# Patient Record
Sex: MALE | Race: WHITE | NOT HISPANIC OR LATINO | Employment: OTHER | URBAN - METROPOLITAN AREA
[De-identification: names, ages, dates, MRNs, and addresses within clinical notes are randomized per-mention and may not be internally consistent; named-entity substitution may affect disease eponyms.]

---

## 2020-01-01 ENCOUNTER — PATIENT OUTREACH (OUTPATIENT)
Dept: HEALTH INFORMATION MANAGEMENT | Facility: OTHER | Age: 65
End: 2020-01-01

## 2020-01-01 ENCOUNTER — APPOINTMENT (OUTPATIENT)
Dept: CARDIOLOGY | Facility: MEDICAL CENTER | Age: 65
DRG: 247 | End: 2020-01-01
Attending: NURSE PRACTITIONER
Payer: COMMERCIAL

## 2020-01-01 ENCOUNTER — HOSPITAL ENCOUNTER (OUTPATIENT)
Dept: RADIOLOGY | Facility: MEDICAL CENTER | Age: 65
End: 2020-02-14
Payer: COMMERCIAL

## 2020-01-01 ENCOUNTER — ANTICOAGULATION MONITORING (OUTPATIENT)
Dept: VASCULAR LAB | Facility: MEDICAL CENTER | Age: 65
End: 2020-01-01

## 2020-01-01 ENCOUNTER — APPOINTMENT (OUTPATIENT)
Dept: CARDIOLOGY | Facility: MEDICAL CENTER | Age: 65
DRG: 247 | End: 2020-01-01
Attending: STUDENT IN AN ORGANIZED HEALTH CARE EDUCATION/TRAINING PROGRAM
Payer: COMMERCIAL

## 2020-01-01 ENCOUNTER — HOSPITAL ENCOUNTER (INPATIENT)
Facility: MEDICAL CENTER | Age: 65
LOS: 2 days | DRG: 247 | End: 2020-02-16
Attending: EMERGENCY MEDICINE | Admitting: INTERNAL MEDICINE
Payer: COMMERCIAL

## 2020-01-01 ENCOUNTER — APPOINTMENT (OUTPATIENT)
Dept: RADIOLOGY | Facility: MEDICAL CENTER | Age: 65
DRG: 247 | End: 2020-01-01
Attending: STUDENT IN AN ORGANIZED HEALTH CARE EDUCATION/TRAINING PROGRAM
Payer: COMMERCIAL

## 2020-01-01 VITALS
TEMPERATURE: 97 F | OXYGEN SATURATION: 97 % | SYSTOLIC BLOOD PRESSURE: 123 MMHG | RESPIRATION RATE: 16 BRPM | DIASTOLIC BLOOD PRESSURE: 69 MMHG | HEIGHT: 75 IN | WEIGHT: 253.75 LBS | BODY MASS INDEX: 31.55 KG/M2 | HEART RATE: 67 BPM

## 2020-01-01 DIAGNOSIS — J44.9 CHRONIC OBSTRUCTIVE PULMONARY DISEASE, UNSPECIFIED COPD TYPE (HCC): Primary | ICD-10-CM

## 2020-01-01 DIAGNOSIS — I20.0 UNSTABLE ANGINA (HCC): ICD-10-CM

## 2020-01-01 DIAGNOSIS — R79.89 ELEVATED TROPONIN: ICD-10-CM

## 2020-01-01 LAB
ALBUMIN SERPL BCP-MCNC: 3.7 G/DL (ref 3.2–4.9)
ALBUMIN/GLOB SERPL: 1.2 G/DL
ALP SERPL-CCNC: 47 U/L (ref 30–99)
ALT SERPL-CCNC: 17 U/L (ref 2–50)
ANION GAP SERPL CALC-SCNC: 8 MMOL/L (ref 0–11.9)
ANION GAP SERPL CALC-SCNC: 9 MMOL/L (ref 0–11.9)
APTT PPP: 21.2 SEC (ref 24.7–36)
AST SERPL-CCNC: 11 U/L (ref 12–45)
BASOPHILS # BLD AUTO: 0.6 % (ref 0–1.8)
BASOPHILS # BLD AUTO: 0.7 % (ref 0–1.8)
BASOPHILS # BLD: 0.1 K/UL (ref 0–0.12)
BASOPHILS # BLD: 0.12 K/UL (ref 0–0.12)
BILIRUB SERPL-MCNC: 0.4 MG/DL (ref 0.1–1.5)
BUN SERPL-MCNC: 17 MG/DL (ref 8–22)
BUN SERPL-MCNC: 24 MG/DL (ref 8–22)
CALCIUM SERPL-MCNC: 8.6 MG/DL (ref 8.5–10.5)
CALCIUM SERPL-MCNC: 9.2 MG/DL (ref 8.5–10.5)
CHLORIDE SERPL-SCNC: 104 MMOL/L (ref 96–112)
CHLORIDE SERPL-SCNC: 107 MMOL/L (ref 96–112)
CHOLEST SERPL-MCNC: 195 MG/DL (ref 100–199)
CO2 SERPL-SCNC: 18 MMOL/L (ref 20–33)
CO2 SERPL-SCNC: 21 MMOL/L (ref 20–33)
CREAT SERPL-MCNC: 1.12 MG/DL (ref 0.5–1.4)
CREAT SERPL-MCNC: 1.14 MG/DL (ref 0.5–1.4)
EKG IMPRESSION: NORMAL
EKG IMPRESSION: NORMAL
EOSINOPHIL # BLD AUTO: 0.16 K/UL (ref 0–0.51)
EOSINOPHIL # BLD AUTO: 0.19 K/UL (ref 0–0.51)
EOSINOPHIL NFR BLD: 0.9 % (ref 0–6.9)
EOSINOPHIL NFR BLD: 1.1 % (ref 0–6.9)
ERYTHROCYTE [DISTWIDTH] IN BLOOD BY AUTOMATED COUNT: 44 FL (ref 35.9–50)
ERYTHROCYTE [DISTWIDTH] IN BLOOD BY AUTOMATED COUNT: 44.2 FL (ref 35.9–50)
EST. AVERAGE GLUCOSE BLD GHB EST-MCNC: 189 MG/DL
ETHANOL BLD-MCNC: 0 G/DL
GLOBULIN SER CALC-MCNC: 3.1 G/DL (ref 1.9–3.5)
GLUCOSE SERPL-MCNC: 166 MG/DL (ref 65–99)
GLUCOSE SERPL-MCNC: 169 MG/DL (ref 65–99)
HBA1C MFR BLD: 8.2 % (ref 0–5.6)
HCT VFR BLD AUTO: 47.3 % (ref 42–52)
HCT VFR BLD AUTO: 51 % (ref 42–52)
HDLC SERPL-MCNC: 31 MG/DL
HGB BLD-MCNC: 16.1 G/DL (ref 14–18)
HGB BLD-MCNC: 17.2 G/DL (ref 14–18)
IMM GRANULOCYTES # BLD AUTO: 0.16 K/UL (ref 0–0.11)
IMM GRANULOCYTES # BLD AUTO: 0.19 K/UL (ref 0–0.11)
IMM GRANULOCYTES NFR BLD AUTO: 0.9 % (ref 0–0.9)
IMM GRANULOCYTES NFR BLD AUTO: 1.1 % (ref 0–0.9)
INR PPP: 0.97 (ref 0.87–1.13)
LDLC SERPL CALC-MCNC: 120 MG/DL
LV EJECT FRACT  99904: 60
LYMPHOCYTES # BLD AUTO: 3.22 K/UL (ref 1–4.8)
LYMPHOCYTES # BLD AUTO: 4.22 K/UL (ref 1–4.8)
LYMPHOCYTES NFR BLD: 19.2 % (ref 22–41)
LYMPHOCYTES NFR BLD: 24.2 % (ref 22–41)
MCH RBC QN AUTO: 31.7 PG (ref 27–33)
MCH RBC QN AUTO: 32 PG (ref 27–33)
MCHC RBC AUTO-ENTMCNC: 33.7 G/DL (ref 33.7–35.3)
MCHC RBC AUTO-ENTMCNC: 34 G/DL (ref 33.7–35.3)
MCV RBC AUTO: 93.9 FL (ref 81.4–97.8)
MCV RBC AUTO: 94 FL (ref 81.4–97.8)
MONOCYTES # BLD AUTO: 1.4 K/UL (ref 0–0.85)
MONOCYTES # BLD AUTO: 1.43 K/UL (ref 0–0.85)
MONOCYTES NFR BLD AUTO: 8 % (ref 0–13.4)
MONOCYTES NFR BLD AUTO: 8.5 % (ref 0–13.4)
NEUTROPHILS # BLD AUTO: 11.4 K/UL (ref 1.82–7.42)
NEUTROPHILS # BLD AUTO: 11.66 K/UL (ref 1.82–7.42)
NEUTROPHILS NFR BLD: 65.3 % (ref 44–72)
NEUTROPHILS NFR BLD: 69.5 % (ref 44–72)
NRBC # BLD AUTO: 0 K/UL
NRBC # BLD AUTO: 0 K/UL
NRBC BLD-RTO: 0 /100 WBC
NRBC BLD-RTO: 0 /100 WBC
PLATELET # BLD AUTO: 186 K/UL (ref 164–446)
PLATELET # BLD AUTO: 196 K/UL (ref 164–446)
PMV BLD AUTO: 9.7 FL (ref 9–12.9)
PMV BLD AUTO: 9.9 FL (ref 9–12.9)
POTASSIUM SERPL-SCNC: 4.4 MMOL/L (ref 3.6–5.5)
POTASSIUM SERPL-SCNC: 4.6 MMOL/L (ref 3.6–5.5)
PROT SERPL-MCNC: 6.8 G/DL (ref 6–8.2)
PROTHROMBIN TIME: 13.1 SEC (ref 12–14.6)
RBC # BLD AUTO: 5.03 M/UL (ref 4.7–6.1)
RBC # BLD AUTO: 5.43 M/UL (ref 4.7–6.1)
SODIUM SERPL-SCNC: 133 MMOL/L (ref 135–145)
SODIUM SERPL-SCNC: 134 MMOL/L (ref 135–145)
TRIGL SERPL-MCNC: 221 MG/DL (ref 0–149)
TROPONIN T SERPL-MCNC: 298 NG/L (ref 6–19)
TSH SERPL DL<=0.005 MIU/L-ACNC: 0.94 UIU/ML (ref 0.38–5.33)
WBC # BLD AUTO: 16.8 K/UL (ref 4.8–10.8)
WBC # BLD AUTO: 17.5 K/UL (ref 4.8–10.8)

## 2020-01-01 PROCEDURE — 3E02340 INTRODUCTION OF INFLUENZA VACCINE INTO MUSCLE, PERCUTANEOUS APPROACH: ICD-10-PCS | Performed by: INTERNAL MEDICINE

## 2020-01-01 PROCEDURE — C9604 PERC D-E COR REVASC T CABG S: HCPCS | Mod: LC

## 2020-01-01 PROCEDURE — 99223 1ST HOSP IP/OBS HIGH 75: CPT | Performed by: INTERNAL MEDICINE

## 2020-01-01 PROCEDURE — 83036 HEMOGLOBIN GLYCOSYLATED A1C: CPT

## 2020-01-01 PROCEDURE — 90686 IIV4 VACC NO PRSV 0.5 ML IM: CPT | Performed by: INTERNAL MEDICINE

## 2020-01-01 PROCEDURE — 80048 BASIC METABOLIC PNL TOTAL CA: CPT

## 2020-01-01 PROCEDURE — A9270 NON-COVERED ITEM OR SERVICE: HCPCS

## 2020-01-01 PROCEDURE — 93010 ELECTROCARDIOGRAM REPORT: CPT | Performed by: INTERNAL MEDICINE

## 2020-01-01 PROCEDURE — 700102 HCHG RX REV CODE 250 W/ 637 OVERRIDE(OP): Performed by: STUDENT IN AN ORGANIZED HEALTH CARE EDUCATION/TRAINING PROGRAM

## 2020-01-01 PROCEDURE — 84443 ASSAY THYROID STIM HORMONE: CPT

## 2020-01-01 PROCEDURE — 99232 SBSQ HOSP IP/OBS MODERATE 35: CPT | Mod: GC | Performed by: INTERNAL MEDICINE

## 2020-01-01 PROCEDURE — 700102 HCHG RX REV CODE 250 W/ 637 OVERRIDE(OP)

## 2020-01-01 PROCEDURE — 36415 COLL VENOUS BLD VENIPUNCTURE: CPT

## 2020-01-01 PROCEDURE — 700102 HCHG RX REV CODE 250 W/ 637 OVERRIDE(OP): Performed by: INTERNAL MEDICINE

## 2020-01-01 PROCEDURE — A9270 NON-COVERED ITEM OR SERVICE: HCPCS | Performed by: INTERNAL MEDICINE

## 2020-01-01 PROCEDURE — 4A023N7 MEASUREMENT OF CARDIAC SAMPLING AND PRESSURE, LEFT HEART, PERCUTANEOUS APPROACH: ICD-10-PCS | Performed by: INTERNAL MEDICINE

## 2020-01-01 PROCEDURE — A9270 NON-COVERED ITEM OR SERVICE: HCPCS | Performed by: STUDENT IN AN ORGANIZED HEALTH CARE EDUCATION/TRAINING PROGRAM

## 2020-01-01 PROCEDURE — 99291 CRITICAL CARE FIRST HOUR: CPT

## 2020-01-01 PROCEDURE — 93306 TTE W/DOPPLER COMPLETE: CPT | Mod: 26 | Performed by: INTERNAL MEDICINE

## 2020-01-01 PROCEDURE — 93005 ELECTROCARDIOGRAM TRACING: CPT

## 2020-01-01 PROCEDURE — B2181ZZ FLUOROSCOPY OF LEFT INTERNAL MAMMARY BYPASS GRAFT USING LOW OSMOLAR CONTRAST: ICD-10-PCS | Performed by: INTERNAL MEDICINE

## 2020-01-01 PROCEDURE — B2151ZZ FLUOROSCOPY OF LEFT HEART USING LOW OSMOLAR CONTRAST: ICD-10-PCS | Performed by: INTERNAL MEDICINE

## 2020-01-01 PROCEDURE — 90471 IMMUNIZATION ADMIN: CPT

## 2020-01-01 PROCEDURE — 700111 HCHG RX REV CODE 636 W/ 250 OVERRIDE (IP): Performed by: INTERNAL MEDICINE

## 2020-01-01 PROCEDURE — 94760 N-INVAS EAR/PLS OXIMETRY 1: CPT

## 2020-01-01 PROCEDURE — 99223 1ST HOSP IP/OBS HIGH 75: CPT | Mod: AI,GC | Performed by: INTERNAL MEDICINE

## 2020-01-01 PROCEDURE — 99152 MOD SED SAME PHYS/QHP 5/>YRS: CPT | Performed by: INTERNAL MEDICINE

## 2020-01-01 PROCEDURE — 85730 THROMBOPLASTIN TIME PARTIAL: CPT

## 2020-01-01 PROCEDURE — 51798 US URINE CAPACITY MEASURE: CPT

## 2020-01-01 PROCEDURE — 700111 HCHG RX REV CODE 636 W/ 250 OVERRIDE (IP)

## 2020-01-01 PROCEDURE — 85610 PROTHROMBIN TIME: CPT

## 2020-01-01 PROCEDURE — 93005 ELECTROCARDIOGRAM TRACING: CPT | Performed by: INTERNAL MEDICINE

## 2020-01-01 PROCEDURE — 027035Z DILATION OF CORONARY ARTERY, ONE ARTERY WITH TWO DRUG-ELUTING INTRALUMINAL DEVICES, PERCUTANEOUS APPROACH: ICD-10-PCS | Performed by: INTERNAL MEDICINE

## 2020-01-01 PROCEDURE — 97161 PT EVAL LOW COMPLEX 20 MIN: CPT

## 2020-01-01 PROCEDURE — 99153 MOD SED SAME PHYS/QHP EA: CPT

## 2020-01-01 PROCEDURE — 71045 X-RAY EXAM CHEST 1 VIEW: CPT

## 2020-01-01 PROCEDURE — 93005 ELECTROCARDIOGRAM TRACING: CPT | Performed by: EMERGENCY MEDICINE

## 2020-01-01 PROCEDURE — 80053 COMPREHEN METABOLIC PANEL: CPT

## 2020-01-01 PROCEDURE — 700105 HCHG RX REV CODE 258: Performed by: NURSE PRACTITIONER

## 2020-01-01 PROCEDURE — B2111ZZ FLUOROSCOPY OF MULTIPLE CORONARY ARTERIES USING LOW OSMOLAR CONTRAST: ICD-10-PCS | Performed by: INTERNAL MEDICINE

## 2020-01-01 PROCEDURE — 700117 HCHG RX CONTRAST REV CODE 255: Performed by: INTERNAL MEDICINE

## 2020-01-01 PROCEDURE — 80307 DRUG TEST PRSMV CHEM ANLYZR: CPT

## 2020-01-01 PROCEDURE — 99232 SBSQ HOSP IP/OBS MODERATE 35: CPT | Performed by: INTERNAL MEDICINE

## 2020-01-01 PROCEDURE — 93306 TTE W/DOPPLER COMPLETE: CPT

## 2020-01-01 PROCEDURE — B2131ZZ FLUOROSCOPY OF MULTIPLE CORONARY ARTERY BYPASS GRAFTS USING LOW OSMOLAR CONTRAST: ICD-10-PCS | Performed by: INTERNAL MEDICINE

## 2020-01-01 PROCEDURE — 85025 COMPLETE CBC W/AUTO DIFF WBC: CPT

## 2020-01-01 PROCEDURE — 99238 HOSP IP/OBS DSCHRG MGMT 30/<: CPT | Mod: GC | Performed by: INTERNAL MEDICINE

## 2020-01-01 PROCEDURE — 93459 L HRT ART/GRFT ANGIO: CPT | Mod: 26,59 | Performed by: INTERNAL MEDICINE

## 2020-01-01 PROCEDURE — 84484 ASSAY OF TROPONIN QUANT: CPT

## 2020-01-01 PROCEDURE — 92937 PRQ TRLUML REVSC CAB GRF 1: CPT | Mod: LC | Performed by: INTERNAL MEDICINE

## 2020-01-01 PROCEDURE — 700105 HCHG RX REV CODE 258: Performed by: INTERNAL MEDICINE

## 2020-01-01 PROCEDURE — 770020 HCHG ROOM/CARE - TELE (206)

## 2020-01-01 PROCEDURE — 700101 HCHG RX REV CODE 250

## 2020-01-01 PROCEDURE — 97165 OT EVAL LOW COMPLEX 30 MIN: CPT

## 2020-01-01 PROCEDURE — 700117 HCHG RX CONTRAST REV CODE 255: Performed by: STUDENT IN AN ORGANIZED HEALTH CARE EDUCATION/TRAINING PROGRAM

## 2020-01-01 PROCEDURE — 80061 LIPID PANEL: CPT

## 2020-01-01 RX ORDER — DULOXETIN HYDROCHLORIDE 30 MG/1
30 CAPSULE, DELAYED RELEASE ORAL DAILY
Status: DISCONTINUED | OUTPATIENT
Start: 2020-01-01 | End: 2020-01-01 | Stop reason: HOSPADM

## 2020-01-01 RX ORDER — CARVEDILOL 3.12 MG/1
3.12 TABLET ORAL 2 TIMES DAILY WITH MEALS
Status: DISCONTINUED | OUTPATIENT
Start: 2020-01-01 | End: 2020-01-01

## 2020-01-01 RX ORDER — CARVEDILOL 3.12 MG/1
3.12 TABLET ORAL 2 TIMES DAILY WITH MEALS
Status: DISCONTINUED | OUTPATIENT
Start: 2020-01-01 | End: 2020-01-01 | Stop reason: HOSPADM

## 2020-01-01 RX ORDER — HEPARIN SODIUM 5000 [USP'U]/100ML
INJECTION, SOLUTION INTRAVENOUS CONTINUOUS
Status: DISCONTINUED | OUTPATIENT
Start: 2020-01-01 | End: 2020-01-01

## 2020-01-01 RX ORDER — LIDOCAINE HYDROCHLORIDE 20 MG/ML
INJECTION, SOLUTION INFILTRATION; PERINEURAL
Status: COMPLETED
Start: 2020-01-01 | End: 2020-01-01

## 2020-01-01 RX ORDER — NICOTINE 21 MG/24HR
21 PATCH, TRANSDERMAL 24 HOURS TRANSDERMAL
Status: DISCONTINUED | OUTPATIENT
Start: 2020-01-01 | End: 2020-01-01 | Stop reason: HOSPADM

## 2020-01-01 RX ORDER — MIDAZOLAM HYDROCHLORIDE 1 MG/ML
INJECTION INTRAMUSCULAR; INTRAVENOUS
Status: COMPLETED
Start: 2020-01-01 | End: 2020-01-01

## 2020-01-01 RX ORDER — OXYCODONE HYDROCHLORIDE 5 MG/1
5 TABLET ORAL EVERY 4 HOURS PRN
Status: DISCONTINUED | OUTPATIENT
Start: 2020-01-01 | End: 2020-01-01 | Stop reason: HOSPADM

## 2020-01-01 RX ORDER — THIAMINE MONONITRATE (VIT B1) 100 MG
100 TABLET ORAL DAILY
Status: DISCONTINUED | OUTPATIENT
Start: 2020-01-01 | End: 2020-01-01 | Stop reason: HOSPADM

## 2020-01-01 RX ORDER — DULOXETIN HYDROCHLORIDE 30 MG/1
30 CAPSULE, DELAYED RELEASE ORAL DAILY
COMMUNITY

## 2020-01-01 RX ORDER — CLOPIDOGREL BISULFATE 75 MG/1
75 TABLET ORAL DAILY
Status: DISCONTINUED | OUTPATIENT
Start: 2020-01-01 | End: 2020-01-01 | Stop reason: HOSPADM

## 2020-01-01 RX ORDER — ANALGESIC BALM 1.74; 4.06 G/29G; G/29G
1 OINTMENT TOPICAL 3 TIMES DAILY PRN
Qty: 1 TUBE | Refills: 0 | Status: SHIPPED | OUTPATIENT
Start: 2020-01-01

## 2020-01-01 RX ORDER — ATORVASTATIN CALCIUM 80 MG/1
80 TABLET, FILM COATED ORAL EVERY EVENING
Status: DISCONTINUED | OUTPATIENT
Start: 2020-01-01 | End: 2020-01-01 | Stop reason: HOSPADM

## 2020-01-01 RX ORDER — CARVEDILOL 3.12 MG/1
3.12 TABLET ORAL 2 TIMES DAILY WITH MEALS
Qty: 60 TAB | Refills: 0 | Status: SHIPPED | OUTPATIENT
Start: 2020-01-01

## 2020-01-01 RX ORDER — ATORVASTATIN CALCIUM 80 MG/1
80 TABLET, FILM COATED ORAL EVERY EVENING
Qty: 60 TAB | Refills: 0 | Status: SHIPPED | OUTPATIENT
Start: 2020-01-01 | End: 2020-01-01

## 2020-01-01 RX ORDER — CLOPIDOGREL 300 MG/1
TABLET, FILM COATED ORAL
Status: COMPLETED
Start: 2020-01-01 | End: 2020-01-01

## 2020-01-01 RX ORDER — ASPIRIN 81 MG/1
81 TABLET ORAL DAILY
Qty: 60 TAB | Refills: 0 | Status: SHIPPED | OUTPATIENT
Start: 2020-01-01 | End: 2020-01-01

## 2020-01-01 RX ORDER — ENALAPRIL MALEATE 2.5 MG/1
2.5 TABLET ORAL TWICE DAILY
Status: DISCONTINUED | OUTPATIENT
Start: 2020-01-01 | End: 2020-01-01

## 2020-01-01 RX ORDER — LISINOPRIL 5 MG/1
5 TABLET ORAL DAILY
Qty: 60 TAB | Refills: 0 | Status: SHIPPED | OUTPATIENT
Start: 2020-01-01 | End: 2020-01-01

## 2020-01-01 RX ORDER — LANOLIN ALCOHOL/MO/W.PET/CERES
100 CREAM (GRAM) TOPICAL DAILY
Qty: 30 TAB | Refills: 0 | Status: SHIPPED | OUTPATIENT
Start: 2020-01-01 | End: 2020-01-01

## 2020-01-01 RX ORDER — LANOLIN ALCOHOL/MO/W.PET/CERES
100 CREAM (GRAM) TOPICAL DAILY
Qty: 30 TAB | Refills: 0 | Status: SHIPPED | OUTPATIENT
Start: 2020-01-01

## 2020-01-01 RX ORDER — GABAPENTIN 400 MG/1
400 CAPSULE ORAL 3 TIMES DAILY
Status: DISCONTINUED | OUTPATIENT
Start: 2020-01-01 | End: 2020-01-01 | Stop reason: HOSPADM

## 2020-01-01 RX ORDER — OXYCODONE HYDROCHLORIDE 5 MG/1
5 TABLET ORAL EVERY 4 HOURS PRN
Status: COMPLETED | OUTPATIENT
Start: 2020-01-01 | End: 2020-01-01

## 2020-01-01 RX ORDER — CARVEDILOL 3.12 MG/1
3.12 TABLET ORAL 2 TIMES DAILY WITH MEALS
Qty: 60 TAB | Refills: 0 | Status: SHIPPED | OUTPATIENT
Start: 2020-01-01 | End: 2020-01-01

## 2020-01-01 RX ORDER — CLOPIDOGREL BISULFATE 75 MG/1
75 TABLET ORAL DAILY
Qty: 60 TAB | Refills: 0 | Status: SHIPPED | OUTPATIENT
Start: 2020-01-01 | End: 2020-01-01 | Stop reason: SDUPTHER

## 2020-01-01 RX ORDER — IBUPROFEN 800 MG/1
800-1600 TABLET ORAL EVERY 8 HOURS PRN
Status: ON HOLD | COMMUNITY
End: 2020-01-01

## 2020-01-01 RX ORDER — HEPARIN SODIUM 1000 [USP'U]/ML
3800 INJECTION, SOLUTION INTRAVENOUS; SUBCUTANEOUS PRN
Status: DISCONTINUED | OUTPATIENT
Start: 2020-01-01 | End: 2020-01-01

## 2020-01-01 RX ORDER — ANALGESIC BALM 1.74; 4.06 G/29G; G/29G
OINTMENT TOPICAL
Status: DISCONTINUED | OUTPATIENT
Start: 2020-01-01 | End: 2020-01-01 | Stop reason: HOSPADM

## 2020-01-01 RX ORDER — HEPARIN SODIUM 200 [USP'U]/100ML
INJECTION, SOLUTION INTRAVENOUS
Status: COMPLETED
Start: 2020-01-01 | End: 2020-01-01

## 2020-01-01 RX ORDER — SODIUM CHLORIDE 9 MG/ML
INJECTION, SOLUTION INTRAVENOUS CONTINUOUS
Status: DISCONTINUED | OUTPATIENT
Start: 2020-01-01 | End: 2020-01-01

## 2020-01-01 RX ORDER — ANALGESIC BALM 1.74; 4.06 G/29G; G/29G
1 OINTMENT TOPICAL 3 TIMES DAILY PRN
Qty: 1 TUBE | Refills: 0 | Status: SHIPPED | OUTPATIENT
Start: 2020-01-01 | End: 2020-01-01

## 2020-01-01 RX ORDER — BIVALIRUDIN 250 MG/5ML
INJECTION, POWDER, LYOPHILIZED, FOR SOLUTION INTRAVENOUS
Status: COMPLETED
Start: 2020-01-01 | End: 2020-01-01

## 2020-01-01 RX ORDER — TRAZODONE HYDROCHLORIDE 50 MG/1
50 TABLET ORAL
Status: COMPLETED | OUTPATIENT
Start: 2020-01-01 | End: 2020-01-01

## 2020-01-01 RX ORDER — AMOXICILLIN 250 MG
2 CAPSULE ORAL 2 TIMES DAILY
Status: DISCONTINUED | OUTPATIENT
Start: 2020-01-01 | End: 2020-01-01 | Stop reason: HOSPADM

## 2020-01-01 RX ORDER — IPRATROPIUM BROMIDE AND ALBUTEROL SULFATE 2.5; .5 MG/3ML; MG/3ML
3 SOLUTION RESPIRATORY (INHALATION)
Status: DISCONTINUED | OUTPATIENT
Start: 2020-01-01 | End: 2020-01-01

## 2020-01-01 RX ORDER — TRAZODONE HYDROCHLORIDE 50 MG/1
50 TABLET ORAL
Status: DISCONTINUED | OUTPATIENT
Start: 2020-01-01 | End: 2020-01-01 | Stop reason: HOSPADM

## 2020-01-01 RX ORDER — POLYETHYLENE GLYCOL 3350 17 G/17G
1 POWDER, FOR SOLUTION ORAL
Status: DISCONTINUED | OUTPATIENT
Start: 2020-01-01 | End: 2020-01-01 | Stop reason: HOSPADM

## 2020-01-01 RX ORDER — CLOPIDOGREL BISULFATE 75 MG/1
75 TABLET ORAL DAILY
Qty: 60 TAB | Refills: 0 | Status: SHIPPED | OUTPATIENT
Start: 2020-01-01 | End: 2020-01-01

## 2020-01-01 RX ORDER — HEPARIN SODIUM,PORCINE 1000/ML
VIAL (ML) INJECTION
Status: COMPLETED
Start: 2020-01-01 | End: 2020-01-01

## 2020-01-01 RX ORDER — ENALAPRILAT 1.25 MG/ML
1.25 INJECTION INTRAVENOUS EVERY 6 HOURS PRN
Status: DISCONTINUED | OUTPATIENT
Start: 2020-01-01 | End: 2020-01-01 | Stop reason: HOSPADM

## 2020-01-01 RX ORDER — CLOPIDOGREL BISULFATE 75 MG/1
75 TABLET ORAL DAILY
Qty: 30 TAB | Refills: 0 | Status: SHIPPED | OUTPATIENT
Start: 2020-01-01

## 2020-01-01 RX ORDER — LISINOPRIL 5 MG/1
5 TABLET ORAL
Status: DISCONTINUED | OUTPATIENT
Start: 2020-01-01 | End: 2020-01-01 | Stop reason: HOSPADM

## 2020-01-01 RX ORDER — HEPARIN SODIUM 1000 [USP'U]/ML
7000 INJECTION, SOLUTION INTRAVENOUS; SUBCUTANEOUS ONCE
Status: DISCONTINUED | OUTPATIENT
Start: 2020-01-01 | End: 2020-01-01

## 2020-01-01 RX ORDER — GUAIFENESIN 600 MG/1
1200 TABLET, EXTENDED RELEASE ORAL 2 TIMES DAILY
Status: DISCONTINUED | OUTPATIENT
Start: 2020-01-01 | End: 2020-01-01 | Stop reason: HOSPADM

## 2020-01-01 RX ORDER — BISACODYL 10 MG
10 SUPPOSITORY, RECTAL RECTAL
Status: DISCONTINUED | OUTPATIENT
Start: 2020-01-01 | End: 2020-01-01 | Stop reason: HOSPADM

## 2020-01-01 RX ORDER — LISINOPRIL 5 MG/1
5 TABLET ORAL DAILY
Qty: 60 TAB | Refills: 0 | Status: SHIPPED | OUTPATIENT
Start: 2020-01-01

## 2020-01-01 RX ORDER — ACETAMINOPHEN 325 MG/1
650 TABLET ORAL EVERY 6 HOURS PRN
Status: DISCONTINUED | OUTPATIENT
Start: 2020-01-01 | End: 2020-01-01 | Stop reason: HOSPADM

## 2020-01-01 RX ADMIN — DULOXETINE HYDROCHLORIDE 30 MG: 30 CAPSULE, DELAYED RELEASE ORAL at 05:52

## 2020-01-01 RX ADMIN — GABAPENTIN 400 MG: 400 CAPSULE ORAL at 04:32

## 2020-01-01 RX ADMIN — CLOPIDOGREL BISULFATE 600 MG: 300 TABLET, FILM COATED ORAL at 13:18

## 2020-01-01 RX ADMIN — GUAIFENESIN 1200 MG: 600 TABLET, EXTENDED RELEASE ORAL at 14:13

## 2020-01-01 RX ADMIN — CLOPIDOGREL BISULFATE 75 MG: 75 TABLET ORAL at 05:51

## 2020-01-01 RX ADMIN — MIDAZOLAM HYDROCHLORIDE 2 MG: 1 INJECTION, SOLUTION INTRAMUSCULAR; INTRAVENOUS at 13:07

## 2020-01-01 RX ADMIN — MENTHOL AND METHYL SALICYLATE: 7.6; 29 OINTMENT TOPICAL at 21:19

## 2020-01-01 RX ADMIN — ENALAPRIL MALEATE 2.5 MG: 2.5 TABLET ORAL at 17:45

## 2020-01-01 RX ADMIN — GABAPENTIN 400 MG: 400 CAPSULE ORAL at 19:30

## 2020-01-01 RX ADMIN — HUMAN ALBUMIN MICROSPHERES AND PERFLUTREN 3 ML: 10; .22 INJECTION, SOLUTION INTRAVENOUS at 12:15

## 2020-01-01 RX ADMIN — HEPARIN SODIUM: 1000 INJECTION, SOLUTION INTRAVENOUS; SUBCUTANEOUS at 12:45

## 2020-01-01 RX ADMIN — INFLUENZA A VIRUS A/BRISBANE/02/2018 IVR-190 (H1N1) ANTIGEN (FORMALDEHYDE INACTIVATED), INFLUENZA A VIRUS A/KANSAS/14/2017 X-327 (H3N2) ANTIGEN (FORMALDEHYDE INACTIVATED), INFLUENZA B VIRUS B/PHUKET/3073/2013 ANTIGEN (FORMALDEHYDE INACTIVATED), AND INFLUENZA B VIRUS B/MARYLAND/15/2016 BX-69A ANTIGEN (FORMALDEHYDE INACTIVATED) 0.5 ML: 15; 15; 15; 15 INJECTION, SUSPENSION INTRAMUSCULAR at 12:21

## 2020-01-01 RX ADMIN — Medication 100 MG: at 05:52

## 2020-01-01 RX ADMIN — GUAIFENESIN 1200 MG: 600 TABLET, EXTENDED RELEASE ORAL at 18:13

## 2020-01-01 RX ADMIN — CLOPIDOGREL BISULFATE: 300 TABLET, FILM COATED ORAL at 13:30

## 2020-01-01 RX ADMIN — Medication 100 MG: at 04:33

## 2020-01-01 RX ADMIN — BIVALIRUDIN 250 MG: 250 INJECTION INTRACAVERNOUS at 12:44

## 2020-01-01 RX ADMIN — OXYCODONE HYDROCHLORIDE 5 MG: 5 TABLET ORAL at 10:06

## 2020-01-01 RX ADMIN — FENTANYL CITRATE 100 MCG: 0.05 INJECTION, SOLUTION INTRAMUSCULAR; INTRAVENOUS at 12:44

## 2020-01-01 RX ADMIN — MIDAZOLAM HYDROCHLORIDE 2 MG: 1 INJECTION, SOLUTION INTRAMUSCULAR; INTRAVENOUS at 12:43

## 2020-01-01 RX ADMIN — ENALAPRIL MALEATE 2.5 MG: 2.5 TABLET ORAL at 04:33

## 2020-01-01 RX ADMIN — HEPARIN SODIUM 2000 UNITS: 200 INJECTION, SOLUTION INTRAVENOUS at 12:43

## 2020-01-01 RX ADMIN — IOHEXOL 200 ML: 350 INJECTION, SOLUTION INTRAVENOUS at 12:43

## 2020-01-01 RX ADMIN — GUAIFENESIN 1200 MG: 600 TABLET, EXTENDED RELEASE ORAL at 17:43

## 2020-01-01 RX ADMIN — GUAIFENESIN 1200 MG: 600 TABLET, EXTENDED RELEASE ORAL at 05:52

## 2020-01-01 RX ADMIN — CARVEDILOL 3.12 MG: 3.12 TABLET, FILM COATED ORAL at 18:13

## 2020-01-01 RX ADMIN — METFORMIN HYDROCHLORIDE 500 MG: 500 TABLET ORAL at 07:58

## 2020-01-01 RX ADMIN — ASPIRIN 81 MG: 81 TABLET, COATED ORAL at 04:32

## 2020-01-01 RX ADMIN — OXYCODONE HYDROCHLORIDE 5 MG: 5 TABLET ORAL at 14:08

## 2020-01-01 RX ADMIN — SENNOSIDES-DOCUSATE SODIUM TAB 8.6-50 MG 2 TABLET: 8.6-5 TAB at 14:13

## 2020-01-01 RX ADMIN — BIVALIRUDIN 250 MG: 250 INJECTION INTRACAVERNOUS at 13:07

## 2020-01-01 RX ADMIN — DULOXETINE HYDROCHLORIDE 30 MG: 30 CAPSULE, DELAYED RELEASE ORAL at 04:32

## 2020-01-01 RX ADMIN — CARVEDILOL 3.12 MG: 3.12 TABLET, FILM COATED ORAL at 07:59

## 2020-01-01 RX ADMIN — OXYCODONE HYDROCHLORIDE 5 MG: 5 TABLET ORAL at 18:40

## 2020-01-01 RX ADMIN — OXYCODONE HYDROCHLORIDE 5 MG: 5 TABLET ORAL at 18:13

## 2020-01-01 RX ADMIN — LIDOCAINE HYDROCHLORIDE: 20 INJECTION, SOLUTION INFILTRATION; PERINEURAL at 12:44

## 2020-01-01 RX ADMIN — ENALAPRIL MALEATE 2.5 MG: 2.5 TABLET ORAL at 05:52

## 2020-01-01 RX ADMIN — NICOTINE TRANSDERMAL SYSTEM 21 MG: 21 PATCH, EXTENDED RELEASE TRANSDERMAL at 05:50

## 2020-01-01 RX ADMIN — CARVEDILOL 3.12 MG: 3.12 TABLET, FILM COATED ORAL at 17:45

## 2020-01-01 RX ADMIN — CLOPIDOGREL BISULFATE 75 MG: 75 TABLET ORAL at 04:33

## 2020-01-01 RX ADMIN — SODIUM CHLORIDE: 9 INJECTION, SOLUTION INTRAVENOUS at 14:23

## 2020-01-01 RX ADMIN — SODIUM CHLORIDE: 9 INJECTION, SOLUTION INTRAVENOUS at 00:58

## 2020-01-01 RX ADMIN — NICOTINE TRANSDERMAL SYSTEM 21 MG: 21 PATCH, EXTENDED RELEASE TRANSDERMAL at 04:32

## 2020-01-01 RX ADMIN — ATORVASTATIN CALCIUM 80 MG: 80 TABLET, FILM COATED ORAL at 18:13

## 2020-01-01 RX ADMIN — NITROGLYCERIN 10 ML: 20 INJECTION INTRAVENOUS at 12:44

## 2020-01-01 RX ADMIN — GABAPENTIN 400 MG: 400 CAPSULE ORAL at 14:13

## 2020-01-01 RX ADMIN — TRAZODONE HYDROCHLORIDE 50 MG: 50 TABLET ORAL at 22:04

## 2020-01-01 RX ADMIN — OXYCODONE HYDROCHLORIDE 5 MG: 5 TABLET ORAL at 07:59

## 2020-01-01 RX ADMIN — ENALAPRIL MALEATE 2.5 MG: 2.5 TABLET ORAL at 18:13

## 2020-01-01 RX ADMIN — GABAPENTIN 400 MG: 400 CAPSULE ORAL at 20:33

## 2020-01-01 RX ADMIN — ATORVASTATIN CALCIUM 80 MG: 80 TABLET, FILM COATED ORAL at 17:44

## 2020-01-01 RX ADMIN — GABAPENTIN 400 MG: 400 CAPSULE ORAL at 05:51

## 2020-01-01 RX ADMIN — ENALAPRIL MALEATE 2.5 MG: 2.5 TABLET ORAL at 14:14

## 2020-01-01 RX ADMIN — ASPIRIN 81 MG: 81 TABLET, COATED ORAL at 05:51

## 2020-01-01 RX ADMIN — GABAPENTIN 400 MG: 400 CAPSULE ORAL at 14:09

## 2020-01-01 RX ADMIN — Medication 100 MG: at 14:13

## 2020-01-01 RX ADMIN — OXYCODONE HYDROCHLORIDE 5 MG: 5 TABLET ORAL at 04:32

## 2020-01-01 RX ADMIN — GUAIFENESIN 1200 MG: 600 TABLET, EXTENDED RELEASE ORAL at 04:33

## 2020-01-01 RX ADMIN — NICOTINE TRANSDERMAL SYSTEM 21 MG: 21 PATCH, EXTENDED RELEASE TRANSDERMAL at 14:14

## 2020-01-01 RX ADMIN — TRAZODONE HYDROCHLORIDE 50 MG: 50 TABLET ORAL at 21:19

## 2020-01-01 RX ADMIN — BIVALIRUDIN 0.2 MG/KG/HR: 250 INJECTION, POWDER, LYOPHILIZED, FOR SOLUTION INTRAVENOUS at 14:30

## 2020-01-01 ASSESSMENT — ENCOUNTER SYMPTOMS
VOMITING: 0
EYE PAIN: 0
PALPITATIONS: 0
CHILLS: 0
BLOOD IN STOOL: 0
SHORTNESS OF BREATH: 0
POLYDIPSIA: 0
HALLUCINATIONS: 0
CHILLS: 0
COUGH: 1
FOCAL WEAKNESS: 0
SORE THROAT: 0
SPUTUM PRODUCTION: 0
COUGH: 1
BLURRED VISION: 0
WEAKNESS: 0
PALPITATIONS: 0
SPUTUM PRODUCTION: 0
MYALGIAS: 0
WHEEZING: 0
SHORTNESS OF BREATH: 0
DOUBLE VISION: 0
WEAKNESS: 0
DOUBLE VISION: 0
FEVER: 0
EYE PAIN: 0
MEMORY LOSS: 0
DIARRHEA: 0
NERVOUS/ANXIOUS: 0
NERVOUS/ANXIOUS: 0
TINGLING: 0
HEMOPTYSIS: 0
EYE DISCHARGE: 0
ABDOMINAL PAIN: 0
LOSS OF CONSCIOUSNESS: 0
BLOOD IN STOOL: 0
BLURRED VISION: 0
HEMOPTYSIS: 0
BRUISES/BLEEDS EASILY: 0
POLYDIPSIA: 0
DIZZINESS: 0
NERVOUS/ANXIOUS: 0
EYE PAIN: 0
DIARRHEA: 0
FALLS: 0
ORTHOPNEA: 0
EYE DISCHARGE: 0
BRUISES/BLEEDS EASILY: 0
HEADACHES: 0
ORTHOPNEA: 0
HEMOPTYSIS: 0
ABDOMINAL PAIN: 0
MYALGIAS: 0
NAUSEA: 0
WEAKNESS: 0
NAUSEA: 0
TINGLING: 0
FOCAL WEAKNESS: 0
MEMORY LOSS: 0
VOMITING: 0
CHILLS: 0
DIARRHEA: 0
FOCAL WEAKNESS: 0
SPUTUM PRODUCTION: 0
DIZZINESS: 0
SORE THROAT: 0
BLOOD IN STOOL: 0
FALLS: 0
VOMITING: 0
WHEEZING: 0
SHORTNESS OF BREATH: 0
EYE DISCHARGE: 0
BRUISES/BLEEDS EASILY: 0
SORE THROAT: 0
HALLUCINATIONS: 0
WHEEZING: 0
FALLS: 0
SINUS PAIN: 0
BLURRED VISION: 0
WEIGHT LOSS: 0
SINUS PAIN: 0
FLANK PAIN: 0
FEVER: 0
FEVER: 0
SINUS PAIN: 0
ABDOMINAL PAIN: 0
FLANK PAIN: 0
TINGLING: 0
HEADACHES: 0
POLYDIPSIA: 0
WEIGHT LOSS: 0
WEIGHT LOSS: 0
MEMORY LOSS: 0
COUGH: 1
NAUSEA: 0
HALLUCINATIONS: 0
HEADACHES: 0
FLANK PAIN: 0
MYALGIAS: 0
DOUBLE VISION: 0
LOSS OF CONSCIOUSNESS: 0
LOSS OF CONSCIOUSNESS: 0
DIZZINESS: 0

## 2020-01-01 ASSESSMENT — COGNITIVE AND FUNCTIONAL STATUS - GENERAL
MOBILITY SCORE: 17
CLIMB 3 TO 5 STEPS WITH RAILING: A LOT
SUGGESTED CMS G CODE MODIFIER MOBILITY: CI
DRESSING REGULAR UPPER BODY CLOTHING: A LITTLE
DRESSING REGULAR LOWER BODY CLOTHING: A LITTLE
SUGGESTED CMS G CODE MODIFIER DAILY ACTIVITY: CH
DAILY ACTIVITIY SCORE: 20
STANDING UP FROM CHAIR USING ARMS: A LITTLE
MOVING FROM LYING ON BACK TO SITTING ON SIDE OF FLAT BED: A LITTLE
WALKING IN HOSPITAL ROOM: A LITTLE
SUGGESTED CMS G CODE MODIFIER MOBILITY: CK
HELP NEEDED FOR BATHING: A LITTLE
MOVING TO AND FROM BED TO CHAIR: A LITTLE
TURNING FROM BACK TO SIDE WHILE IN FLAT BAD: A LITTLE
DAILY ACTIVITIY SCORE: 24
CLIMB 3 TO 5 STEPS WITH RAILING: A LITTLE
MOBILITY SCORE: 23
SUGGESTED CMS G CODE MODIFIER DAILY ACTIVITY: CJ
TOILETING: A LITTLE

## 2020-01-01 ASSESSMENT — LIFESTYLE VARIABLES
EVER_SMOKED: YES
EVER_SMOKED: YES
HAVE YOU EVER FELT YOU SHOULD CUT DOWN ON YOUR DRINKING: NO
HOW MANY TIMES IN THE PAST YEAR HAVE YOU HAD 5 OR MORE DRINKS IN A DAY: 0
EVER HAD A DRINK FIRST THING IN THE MORNING TO STEADY YOUR NERVES TO GET RID OF A HANGOVER: NO
AVERAGE NUMBER OF DAYS PER WEEK YOU HAVE A DRINK CONTAINING ALCOHOL: 0
TOTAL SCORE: 0
TOTAL SCORE: 0
SUBSTANCE_ABUSE: 0
CONSUMPTION TOTAL: NEGATIVE
TOTAL SCORE: 0
ON A TYPICAL DAY WHEN YOU DRINK ALCOHOL HOW MANY DRINKS DO YOU HAVE: 0
ALCOHOL_USE: NO
SUBSTANCE_ABUSE: 0
HAVE PEOPLE ANNOYED YOU BY CRITICIZING YOUR DRINKING: NO
EVER FELT BAD OR GUILTY ABOUT YOUR DRINKING: NO
SUBSTANCE_ABUSE: 0

## 2020-01-01 ASSESSMENT — GAIT ASSESSMENTS
DEVIATION: BRADYKINETIC
ASSISTIVE DEVICE: QUAD CANE
DISTANCE (FEET): 400
GAIT LEVEL OF ASSIST: SUPERVISED

## 2020-01-01 ASSESSMENT — PATIENT HEALTH QUESTIONNAIRE - PHQ9
SUM OF ALL RESPONSES TO PHQ9 QUESTIONS 1 AND 2: 0
2. FEELING DOWN, DEPRESSED, IRRITABLE, OR HOPELESS: NOT AT ALL
1. LITTLE INTEREST OR PLEASURE IN DOING THINGS: NOT AT ALL

## 2020-01-01 ASSESSMENT — COPD QUESTIONNAIRES
DURING THE PAST 4 WEEKS HOW MUCH DID YOU FEEL SHORT OF BREATH: NONE/LITTLE OF THE TIME
COPD SCREENING SCORE: 4
DO YOU EVER COUGH UP ANY MUCUS OR PHLEGM?: NO/ONLY WITH OCCASIONAL COLDS OR INFECTIONS
HAVE YOU SMOKED AT LEAST 100 CIGARETTES IN YOUR ENTIRE LIFE: YES

## 2020-01-01 ASSESSMENT — ACTIVITIES OF DAILY LIVING (ADL): TOILETING: INDEPENDENT

## 2020-02-14 PROBLEM — E66.9 OBESITY: Status: ACTIVE | Noted: 2020-01-01

## 2020-02-14 PROBLEM — E78.5 DYSLIPIDEMIA: Status: ACTIVE | Noted: 2020-01-01

## 2020-02-14 PROBLEM — I21.4 NON-STEMI (NON-ST ELEVATED MYOCARDIAL INFARCTION) (HCC): Status: ACTIVE | Noted: 2020-01-01

## 2020-02-14 PROBLEM — I10 ESSENTIAL HYPERTENSION: Status: ACTIVE | Noted: 2020-01-01

## 2020-02-14 PROBLEM — Z72.0 TOBACCO USE: Status: ACTIVE | Noted: 2020-01-01

## 2020-02-14 NOTE — ED PROVIDER NOTES
ED Provider Note    Scribed for Curtis Hernandez M.D. by Edouard Blum. 2/14/2020  9:36 AM    Primary care provider: Kal Rios M.D.  Means of arrival: EMS  History obtained from: Patient  History limited by: None    CHIEF COMPLAINT  Chief Complaint   Patient presents with   • Chest Pain   • Abnormal Labs     troponin 2.4       HPI  Jan Ramirez is a 64 y.o. male with a history of hypertension, dyslipidemia, CAD and CABG x4 in 2007 who [facility] after originally presenting with three weeks of exertional, substernal, and crushing chest pain that worsened to a 7/10  yesterday. The pain is intermittent, lasting 20 minutes at a time. Occurs at rest as well, but exertion will significantly increase the pain, can not tolerate stairs or anything but short distance walking. [facility]. He was treated with aspirin and nitroglycerin PTA and states his pain improved to a 3-4/10 after the nitroglycerin. He endorses constant mild chest pain at baseline. He denies leg swelling, shortness of breath, diarrhea, nausea, or vomiting. He has not had a recent stress test. Patient denies a history of DVT or PE. No significant family history of cardiac disease. He smokes cigarettes, 1.5 to 2 packs/day, since age 17.  Denies drugs, no longer drinks alcohol since 2018 however was previously a very heavy drinker.    REVIEW OF SYSTEMS  Pertinent positives include: chest pain.  Pertinent negatives include: leg swelling, shortness of breath, diarrhea, nausea, or vomiting.  10+ systems reviewed and negative.      PAST MEDICAL HISTORY  Past Medical History:   Diagnosis Date   • Alcohol abuse, unspecified    • Hemorrhoids    • High cholesterol    • Hx of CABG    • Hypertension    • Neuropathy    • S/P diskectomy December 2010    anterior cervical diskectomy abd fusion   • Seizure disorder        FAMILY HISTORY  No significant cardiac  "history    SOCIAL HISTORY  Social History     Tobacco Use   • Smoking status: Yes cigarettes   Substance Use Topics        • Drug use: None noted.     SURGICAL HISTORY  Past Surgical History:   Procedure Laterality Date   • OTHER CARDIAC SURGERY  3/2012       CURRENT MEDICATIONS  No current facility-administered medications on file prior to encounter.      Current Outpatient Medications on File Prior to Encounter   Medication Sig Dispense Refill   • Oral Electrolytes (BUFFERED SALT) 482 MG TABS Take 2 Tabs by mouth 2 Times a Day. 240 Tab 0   • nitrofurantoin monohydr macro (MACROBID) 100 MG CAPS Take 1 Cap by mouth 2 times a day. 10 Each 0   • baclofen (LIORESAL) 10 MG TABS Take 10 mg by mouth 3 times a day. Indications: Muscle Spasticity     • aspirin EC (ECOTRIN) 81 MG TBEC Take 81 mg by mouth every day.     • gabapentin (NEURONTIN) 300 MG CAPS Take  by mouth 2 Times a Day. Cant recall dosage.      • simvastatin (ZOCOR) 40 MG TABS Take 40 mg by mouth every evening.     • potassium chloride (KLOR-CON) 20 MEQ PACK Take 20 mEq by mouth 2 times a day.     • thiamine (THIAMINE) 100 MG TABS Take 100 mg by mouth every day.     • bumetanide (BUMEX) 1 MG TABS Take 1 mg by mouth every day.     • pantoprazole (PROTONIX) 40 MG PACK 40 mg by Nasogastric route every day.     • lorazepam (ATIVAN) 0.5 MG TABS Take 0.5 mg by mouth every four hours as needed.     • oxycodone CR (OXYCONTIN) 10 MG TB12 Take 10 mg by mouth every 12 hours.     • atenolol (TENORMIN) 25 MG TABS Take 25 mg by mouth every day.         ALLERGIES  No Known Allergies    PHYSICAL EXAM  VITAL SIGNS: /74   Pulse 68   Resp 18   Ht 1.905 m (6' 3\")   Wt 115.7 kg (255 lb)   BMI 31.87 kg/m²   Reviewed   Constitutional: Well developed, Well nourished, obese, no apparent distress, nondiaphoretic.  HENT: Normocephalic, atraumatic, bilateral external ears normal, oropharynx moist, No exudates or erythema.   Eyes: PERRLA, conjunctiva pink, no scleral icterus. "   Cardiovascular: Regular rate and rhythm. No murmurs, rubs or gallops.   Respiratory: Scattered rhonchi, no wheezing  Abdominal:  Abdomen soft, non-tender, non distended. No rebound, or guarding.    Skin: No erythema, no rash. Chronic venous stasis changes of the bilateral lower extremities  Genitourinary: No costovertebral angle tenderness.   Musculoskeletal: No peripheral edema.   Neurologic: Alert & oriented x 3, cranial nerves 2-12 intact by passive exam.  No focal deficit noted.  Psychiatric: Affect normal, Judgment normal, Mood normal.     DIFFERENTIAL DIAGNOSIS:  Acute coronary syndrome, msk chest pain, pulmonary embolism, reyna/myocarditis    EKG Interpretation:  Interpreted by me    Rhythm:  Normal sinus rhythm   Rate: 61 bpm  Axis: normal  Ectopy: none  Conduction: Incomplete right bundle sheyla block and left anterior fascicular block  ST Segments: depression in I,V4-6  T Waves: no acute change  Clinical Impression: Abnormal EKG with signs of ischemia and possible previous myocardial infarct    RADIOLOGY/PROCEDURES  DX-CHEST-PORTABLE (1 VIEW)   Final Result         Mild diffuse interstitial prominence could relate to hypoventilatory change or mild edema.     Radiologist interpretation have been reviewed by me.     LABORATORY:  Results for orders placed or performed during the hospital encounter of 02/14/20   Prothrombin Time   Result Value Ref Range    PT 13.1 12.0 - 14.6 sec    INR 0.97 0.87 - 1.13   APTT   Result Value Ref Range    APTT 21.2 (L) 24.7 - 36.0 sec   TROPONIN   Result Value Ref Range    Troponin T 298 (H) 6 - 19 ng/L   CBC WITH DIFFERENTIAL   Result Value Ref Range    WBC 17.5 (H) 4.8 - 10.8 K/uL    RBC 5.43 4.70 - 6.10 M/uL    Hemoglobin 17.2 14.0 - 18.0 g/dL    Hematocrit 51.0 42.0 - 52.0 %    MCV 93.9 81.4 - 97.8 fL    MCH 31.7 27.0 - 33.0 pg    MCHC 33.7 33.7 - 35.3 g/dL    RDW 44.2 35.9 - 50.0 fL    Platelet Count 196 164 - 446 K/uL    MPV 9.7 9.0 - 12.9 fL    Neutrophils-Polys 65.30  44.00 - 72.00 %    Lymphocytes 24.20 22.00 - 41.00 %    Monocytes 8.00 0.00 - 13.40 %    Eosinophils 0.90 0.00 - 6.90 %    Basophils 0.70 0.00 - 1.80 %    Immature Granulocytes 0.90 0.00 - 0.90 %    Nucleated RBC 0.00 /100 WBC    Neutrophils (Absolute) 11.40 (H) 1.82 - 7.42 K/uL    Lymphs (Absolute) 4.22 1.00 - 4.80 K/uL    Monos (Absolute) 1.40 (H) 0.00 - 0.85 K/uL    Eos (Absolute) 0.16 0.00 - 0.51 K/uL    Baso (Absolute) 0.12 0.00 - 0.12 K/uL    Immature Granulocytes (abs) 0.16 (H) 0.00 - 0.11 K/uL    NRBC (Absolute) 0.00 K/uL   Basic Metabolic Panel   Result Value Ref Range    Sodium 134 (L) 135 - 145 mmol/L    Potassium 4.6 3.6 - 5.5 mmol/L    Chloride 104 96 - 112 mmol/L    Co2 21 20 - 33 mmol/L    Glucose 169 (H) 65 - 99 mg/dL    Bun 17 8 - 22 mg/dL    Creatinine 1.12 0.50 - 1.40 mg/dL    Calcium 9.2 8.5 - 10.5 mg/dL    Anion Gap 9.0 0.0 - 11.9   TSH   Result Value Ref Range    TSH 0.940 0.380 - 5.330 uIU/mL     [facility] and unremarkable other than a troponin of 2.3.  Coags were not performed at the [facility].    Lab results reviewed by me.     INTERVENTIONS:  Medications   MD Alert...HEPARIN WEIGHT BASED PROTOCOL Pharmacist to implement 1 Each (has no administration in time range)         ED COURSE:  Nursing notes, VS, PMSFHx reviewed in chart.     9:36 AM - Patient seen and examined at bedside. Ordered estimated GFR, PTT, APTT, troponin, CBC with differential, BMP, and TSH to evaluate.    9:45 AM Paged Cardiology.    9:51 AM I discussed the patient's case and the above findings with Dr. Chapin (Cardiology) who would like me to start the patient on a heparin drip and admit to medicine.    10:00 AM Paged [facility].    10:04 AM I discussed the patient's case and the above findings with [facility] who agree to evaluate for hospitalization.    MEDICAL DECISION MAKIN-year-old man with significant history of coronary artery disease, previous four-vessel CABG in .  [facility].  Troponin persistently elevated here at [facility], likely N STEMI.  Cardiology consulted, will start on heparin and consider coronary angiogram with possible PCI.  He will be admitted to the [facility] medicine service.    PLAN:  Admit to [facility] medicine for echocardiogram and cardiac catheterization, cardiology consultation    CONDITION: Guarded    DISPOSITION:  Patient will be hospitalized by [facility] IM in guarded condition.    FINAL IMPRESSION  1. Elevated troponin    2. Unstable angina (HCC)    3.      Critical care time 35 minutes     IEdouard (Scribe), am scribing for, and in the presence of, Curtis Hernandez M.D..    Electronically signed by: Edouard Blum (Scribe), 2/14/2020    ICurtis M.D. personally performed the services described in this documentation, as scribed by Edouard Blum in my presence, and it is both accurate and complete.    YESENIA Onofre M.D.    I independently evaluated the patient and repeated the important components of the history and physical. I discussed the management with the resident. I have reviewed and agree with the pertinent clinical information above including history, exam, study findings, and recommendations.     Electronically signed by: Curtis Hernandez M.D., 2/14/2020 4:30 PM       The note accurately reflects work and decisions made by me.  Curtis Hernandez M.D.  2/14/2020  4:27 PM

## 2020-02-14 NOTE — ED TRIAGE NOTES
"[facility]. Pt has hx of CABG x 4 12 years ago.  No recent cardiologist appt. Pt chest pain better after nitro. Pain has been ongoing for 3 weeks, pt presented because advice nurse told him \"to get checked\"   "

## 2020-02-14 NOTE — ASSESSMENT & PLAN NOTE
- Significant history of tobacco use.  Currently using 1.5-2 packs/day, roughly 70-pack-year history.  - Nicotine transdermal and p.o. available  - Cessation counseling

## 2020-02-14 NOTE — H&P
History & Physical Note    Date of Admission: 2/14/2020  Admission Status: Inpatient  [facility] Team: [facility] White Team  Attending: Garrick Aparicio M.D.   Senior Resident: Dr. Torres  Intern: Dr. Tran  Contact Number: [phone]    Chief Complaint: N-STEMI     History of Present Illness (HPI):   Jan is a 64 y.o. male who presented 2/14/2020 with substernal chest pain.  Patient has a PMHx CABG x4 in 2007, HTN, DLD, CAD, obesity who presented to the [facility] after having substernal chest pain which he describes as crushing, with radiation sometimes to the left arm, associated with exertion.  This pain is intermittent and can last for up to 30 minutes at a time.  It does occur while the patient is resting as well.  He has minimal exercise tolerance.  This has been ongoing for the past several weeks.  Patient does have a significant smoking history of 1.5-2 packs/day for the past 50 years.  His current medications are only duloxetine, ibuprofen, gabapentin.  He is not currently on an aspirin or statin.  [facility] he had an elevated troponin of 2.3.  He was transferred to [facility] for possible cardiac intervention.    In ED: EKG demonstrating NSR without significant ST segment elevation.  Troponin T298, WBC 17.5, glucose 169, TSH 0.9, CXR demonstrating mild diffuse interstitial prominence without consolidations.  Patient was seen by Dr. Dior, cardiology, who has scheduled the patient for catheterization today and further recommended ASA, statin, and continued intravenous heparin.    Review of Systems:   Review of Systems   Constitutional: Negative for chills, fever, malaise/fatigue and weight loss.   HENT: Negative for congestion, ear discharge, sinus pain and sore throat.    Eyes: Negative for blurred vision, double vision, pain and discharge.   Respiratory: Positive for cough (Chronic). Negative for hemoptysis, sputum production, shortness of breath and wheezing.    Cardiovascular: Positive  for chest pain.        See HPI    Gastrointestinal: Negative for abdominal pain, blood in stool, diarrhea, melena, nausea and vomiting.   Genitourinary: Negative for dysuria, flank pain, frequency and hematuria.   Musculoskeletal: Negative for falls and myalgias.   Skin: Negative for rash.   Neurological: Negative for dizziness, tingling, focal weakness, loss of consciousness, weakness and headaches.   Endo/Heme/Allergies: Negative for polydipsia. Does not bruise/bleed easily.   Psychiatric/Behavioral: Negative for hallucinations, memory loss, substance abuse and suicidal ideas. The patient is not nervous/anxious.          Past Medical History:   Past Medical History was reviewed with patient.   has a past medical history of Alcohol abuse, unspecified, Hemorrhoids, High cholesterol, CABG, Hypertension, Neuropathy, S/P diskectomy (December 2010), and Seizure disorder.    Past Surgical History: Past Surgical History was reviewed with patient.  CABG x4 2007  C-Spine fusion & fixation x2  Rotator Cuff repair   has a past surgical history that includes other cardiac surgery (3/2012).    Medications: Medications have been reviewed with patient.  Prior to Admission Medications   Prescriptions Last Dose Informant Patient Reported? Taking?   DULoxetine (CYMBALTA) 30 MG Cap DR Particles 2/14/2020 at AM Patient Yes Yes   Sig: Take 30 mg by mouth every day.   gabapentin (NEURONTIN) 400 MG Cap 2/14/2020 at AM Patient Yes No   Sig: Take 400 mg by mouth 3 times a day.   ibuprofen (MOTRIN) 800 MG Tab 2/14/2020 at AM Patient Yes Yes   Sig: Take 800-1,600 mg by mouth every 8 hours as needed.      Facility-Administered Medications: None        Allergies: Allergies have been reviewed with patient.  No Known Allergies    Family History:   Father: Lung cancer  Mother: Son  No family hx of cardiac disease      Social History:   Tobacco: 70 pack year hx  Alcohol: Infrequent use   Recreational drugs (illegal and prescription):   None  Employment: retired  Activity Level: minimal    Living situation:  [facility]  Recent travel:  none  Primary Care Provider: reviewed Pcp Not In Computer  Other (stressors, spirituality, exposures):  None  Physical Exam:     Vitals:  Pulse:  [68] 68  Resp:  [18] 18  BP: (143)/(74) 143/74    Physical Exam  Vitals signs and nursing note reviewed.   Constitutional:       General: He is not in acute distress.     Appearance: Normal appearance. He is obese.   HENT:      Head: Normocephalic and atraumatic.      Right Ear: Ear canal normal.      Left Ear: Ear canal normal.      Nose: Nose normal. No rhinorrhea.      Mouth/Throat:      Pharynx: Oropharynx is clear. No oropharyngeal exudate or posterior oropharyngeal erythema.   Eyes:      General: No scleral icterus.     Extraocular Movements: Extraocular movements intact.      Conjunctiva/sclera: Conjunctivae normal.      Pupils: Pupils are equal, round, and reactive to light.   Neck:      Musculoskeletal: Normal range of motion and neck supple.   Cardiovascular:      Rate and Rhythm: Normal rate and regular rhythm.      Pulses: Normal pulses.      Heart sounds: Normal heart sounds. No murmur.   Pulmonary:      Effort: Pulmonary effort is normal. No respiratory distress.      Breath sounds: Normal breath sounds. No wheezing or rhonchi.   Abdominal:      General: Bowel sounds are normal. There is no distension.      Palpations: Abdomen is soft. There is no mass.      Tenderness: There is no abdominal tenderness. There is no guarding.      Comments: Umbilical hernia   Musculoskeletal: Normal range of motion.         General: No swelling, tenderness or deformity.      Right lower leg: No edema.      Left lower leg: No edema.   Lymphadenopathy:      Cervical: No cervical adenopathy.   Skin:     General: Skin is warm and dry.      Coloration: Skin is not jaundiced.      Findings: No erythema or rash.   Neurological:      General: No focal deficit present.      Mental Status: He  is alert and oriented to person, place, and time.      Cranial Nerves: No cranial nerve deficit.      Sensory: No sensory deficit.      Motor: No weakness.      Coordination: Coordination normal.   Psychiatric:         Mood and Affect: Mood normal.         Behavior: Behavior normal.         Thought Content: Thought content normal.         Labs:   Results for orders placed or performed during the hospital encounter of 02/14/20   Prothrombin Time   Result Value Ref Range    PT 13.1 12.0 - 14.6 sec    INR 0.97 0.87 - 1.13   APTT   Result Value Ref Range    APTT 21.2 (L) 24.7 - 36.0 sec   TROPONIN   Result Value Ref Range    Troponin T 298 (H) 6 - 19 ng/L   CBC WITH DIFFERENTIAL   Result Value Ref Range    WBC 17.5 (H) 4.8 - 10.8 K/uL    RBC 5.43 4.70 - 6.10 M/uL    Hemoglobin 17.2 14.0 - 18.0 g/dL    Hematocrit 51.0 42.0 - 52.0 %    MCV 93.9 81.4 - 97.8 fL    MCH 31.7 27.0 - 33.0 pg    MCHC 33.7 33.7 - 35.3 g/dL    RDW 44.2 35.9 - 50.0 fL    Platelet Count 196 164 - 446 K/uL    MPV 9.7 9.0 - 12.9 fL    Neutrophils-Polys 65.30 44.00 - 72.00 %    Lymphocytes 24.20 22.00 - 41.00 %    Monocytes 8.00 0.00 - 13.40 %    Eosinophils 0.90 0.00 - 6.90 %    Basophils 0.70 0.00 - 1.80 %    Immature Granulocytes 0.90 0.00 - 0.90 %    Nucleated RBC 0.00 /100 WBC    Neutrophils (Absolute) 11.40 (H) 1.82 - 7.42 K/uL    Lymphs (Absolute) 4.22 1.00 - 4.80 K/uL    Monos (Absolute) 1.40 (H) 0.00 - 0.85 K/uL    Eos (Absolute) 0.16 0.00 - 0.51 K/uL    Baso (Absolute) 0.12 0.00 - 0.12 K/uL    Immature Granulocytes (abs) 0.16 (H) 0.00 - 0.11 K/uL    NRBC (Absolute) 0.00 K/uL   Basic Metabolic Panel   Result Value Ref Range    Sodium 134 (L) 135 - 145 mmol/L    Potassium 4.6 3.6 - 5.5 mmol/L    Chloride 104 96 - 112 mmol/L    Co2 21 20 - 33 mmol/L    Glucose 169 (H) 65 - 99 mg/dL    Bun 17 8 - 22 mg/dL    Creatinine 1.12 0.50 - 1.40 mg/dL    Calcium 9.2 8.5 - 10.5 mg/dL    Anion Gap 9.0 0.0 - 11.9   TSH   Result Value Ref Range    TSH 0.940  0.380 - 5.330 uIU/mL   ESTIMATED GFR   Result Value Ref Range    GFR If African American >60 >60 mL/min/1.73 m 2    GFR If Non African American >60 >60 mL/min/1.73 m 2   EKG   Result Value Ref Range    Report       [facility] Emergency Dept.    Test Date:  2020  Pt Name:    CORI HODGE               Department: ER  MRN:        [id number]                      Room:        07  Gender:     Male                         Technician: [id number]  :        [id number]                   Requested By:ER TRIAGE PROTOCOL  Order #:    [id number]                    Reading MD:    Measurements  Intervals                                Axis  Rate:       61                           P:          -9  GA:         164                          QRS:        -41  QRSD:       110                          T:          35  QT:         376  QTc:        379    Interpretive Statements  SINUS RHYTHM  PROBABLE LEFT ATRIAL ABNORMALITY  INCOMPLETE RBBB AND LAFB  CONSIDER ANTEROSEPTAL INFARCT  Compared to ECG 2012 15:01:54  Left anterior fascicular block now present  Incomplete right bundle-branch block now present  Right bundle-branch block now present  Myocardial infarct finding now present  Sinus sun ycardia no longer present  Intraventricular conduction delay no longer present           EKG: Per my read, NSR w/ no ST segment changes    Imaging:   DX-CHEST-PORTABLE (1 VIEW)   Final Result         Mild diffuse interstitial prominence could relate to hypoventilatory change or mild edema.      OUTSIDE IMAGES-DX CHEST   Final Result      OUTSIDE IMAGES-DX CHEST   Final Result      CL-LEFT HEART CATHETERIZATION WITH POSSIBLE INTERVENTION    (Results Pending)         Previous Data Review: reviewed    * Non-STEMI (non-ST elevated myocardial infarction) (HCC)  Assessment & Plan  -Substernal chest pain, worse with exertion but also present at rest, radiation down the arm, history of CABG x4 in   - EKG NSR without significant  ST segment elevation  -CXR demonstrating mild diffuse interstitial prominence without consolidation  -Troponin 298  Plan:  - Cardiology following: Cath procedure today, further recommendations include initiating ASA and statin  - Admit to telemetry post-cath  - ASA and statin.  Will await further antiplatelet recommendations from cardiology.  -We will await cath results for possible EF estimate to evaluate for heart failure    Leukocytosis- (present on admission)  Assessment & Plan  - Likely reactive.   - CXR demonstrated mild diffuse interstitial prominence without consolidation  - Will evaluate for flu, order pro calcitonin, BMP  - No systemic signs of infection, with the exception of the WBC      Tobacco use  Assessment & Plan  - Significant history of tobacco use.  Currently using 1.5-2 packs/day, roughly 70-pack-year history.  - Nicotine transdermal and p.o. available  - Cessation counseling    Obesity  Assessment & Plan  -We will place consultation to nutrition.  Patient may also follow-up with this as an outpatient.    Dyslipidemia  Assessment & Plan  - Will initiate statin therapy.  Lipid panel in a.m.    Essential hypertension  Assessment & Plan  - Will initiate carvedilol

## 2020-02-14 NOTE — CONSULTS
Reason for Consult:  Asked by Dr Hernandez to see this patient with  NSTEMI  Patient's PCP: Pcp Not In Computer    CC:   Chief Complaint   Patient presents with   • Chest Pain   • Abnormal Labs     troponin 2.4       HPI: 64-year-old male patient transferred from [facility] for non-ST elevation MI.  He had history of coronary artery disease, four-vessel bypass surgery at [facility] in 2007, hypertension, diabetes mellitus, chronic back pain.  He had few day history of on and off chest pains, moderate to severe crushing chest pains lasting for few minutes associated with shortness of breath, radiates to entire chest.  Smokes about 1.5 to 2 packs/day, is not going to quit.  He did have few episodes of chest pain this morning, relieved with nitro.    Medications / Drug list prior to admission:  No current facility-administered medications on file prior to encounter.      Current Outpatient Medications on File Prior to Encounter   Medication Sig Dispense Refill   • DULoxetine (CYMBALTA) 30 MG Cap DR Particles Take 30 mg by mouth every day.     • ibuprofen (MOTRIN) 800 MG Tab Take 800-1,600 mg by mouth every 8 hours as needed.     • gabapentin (NEURONTIN) 400 MG Cap Take 400 mg by mouth 3 times a day.         Current list of administered Medications:    Current Facility-Administered Medications:   •  heparin injection 7,000 Units, 7,000 Units, Intravenous, Once **AND** heparin injection 3,800 Units, 3,800 Units, Intravenous, PRN **AND** heparin infusion 25,000 units in 500 mL 0.45% NACL, , Intravenous, Continuous **AND** Protocol 440 Heparin Weight Based DO NOT GIVE ANY HEPARIN BOLUS TO STROKE PATIENT, , , CONTINUOUS **AND** Protocol 440 Heparin Weight Based Discontinue Enoxaparin (Lovenox), Dabigatran (Pradaxa), Rivaroxaban (Xarelto), Apixaban (Eliquis), Edoxaban (Savaysa, Lixiana), Fondaparinux (Arixtra) and Argatroban prior to heparin administration, , , CONTINUOUS **AND** Protocol 440 Heparin Weight Based Draw  baseline aPTT, PT, and platelet count if not already done, , , CONTINUOUS **AND** Protocol 440 Heparin Weight Based Draw aPTT 6 hours after beginning infusion. , , , CONTINUOUS **AND** Protocol 440 Heparin Weight Based Draw Platelet count every three days. Contact MD if platelet is 50% lower than baseline count., , , CONTINUOUS **AND** Protocol 440 Heparin Weight Based Record Patient Data, , , CONTINUOUS **AND** Protocol 440 Heparin Weight Based INSTRUCTIONS, , , CONTINUOUS **AND** Protocol 440 Heparin Weight Based Review aPTT results 6 hours after infusion is begun as detailed, , , CONTINUOUS **AND** Protocol 440 Heparin Weight Based Adjust heparin to maintain aPTT between 55-96 sec, , , CONTINUOUS **AND** Protocol 440 Heparin Weight Based Order aPTT 6 hours after any rate change or hold until aPTT is therapeutic (55-96 seconds), , , CONTINUOUS **AND** Protocol 440 Heparin Weight Based Documentation and verification, , , CONTINUOUS, Curtis Hernandez M.D.    Current Outpatient Medications:   •  DULoxetine (CYMBALTA) 30 MG Cap DR Particles, Take 30 mg by mouth every day., Disp: , Rfl:   •  ibuprofen (MOTRIN) 800 MG Tab, Take 800-1,600 mg by mouth every 8 hours as needed., Disp: , Rfl:   •  gabapentin (NEURONTIN) 400 MG Cap, Take 400 mg by mouth 3 times a day., Disp: , Rfl:     Past Medical History:   Diagnosis Date   • Alcohol abuse, unspecified    • Hemorrhoids    • High cholesterol    • Hx of CABG    • Hypertension    • Neuropathy    • S/P diskectomy December 2010    anterior cervical diskectomy abd fusion   • Seizure disorder        Past Surgical History:   Procedure Laterality Date   • OTHER CARDIAC SURGERY  3/2012       No family history of premature coronary artery disease.    Social History     Socioeconomic History   • Marital status: Single     Spouse name: Not on file   • Number of children: Not on file   • Years of education: Not on file   • Highest education level: Not on file   Occupational History   • Not  "on file   Social Needs   • Financial resource strain: Not on file   • Food insecurity     Worry: Not on file     Inability: Not on file   • Transportation needs     Medical: Not on file     Non-medical: Not on file   Tobacco Use   • Smoking status: Not on file   Substance and Sexual Activity   • Alcohol use: Not on file   • Drug use: Not on file   • Sexual activity: Not on file   Lifestyle   • Physical activity     Days per week: Not on file     Minutes per session: Not on file   • Stress: Not on file   Relationships   • Social connections     Talks on phone: Not on file     Gets together: Not on file     Attends Anglican service: Not on file     Active member of club or organization: Not on file     Attends meetings of clubs or organizations: Not on file     Relationship status: Not on file   • Intimate partner violence     Fear of current or ex partner: Not on file     Emotionally abused: Not on file     Physically abused: Not on file     Forced sexual activity: Not on file   Other Topics Concern   • Not on file   Social History Narrative   • Not on file       ALLERGIES:  No Known Allergies    Review of systems:  A detailed review of symptoms was reviewed with patient. This is reviewed in H&P and PMH. ALL OTHERS reviewed and negative    Physical exam:  Patient Vitals for the past 24 hrs:   BP Pulse Resp Height Weight   02/14/20 0922 143/74 68 18 -- --   02/14/20 0920 -- -- -- 1.905 m (6' 3\") 115.7 kg (255 lb)     General: No acute distress.   EYES: no jaundice  HEENT: OP clear   Neck: No bruits No JVD.   CVS:   RRR. S1 + S2. No M/R/G  Resp: CTAB. No wheezing or crackles/rhonchi.  Abdomen: Soft, NT, ND,  Skin: Grossly nothing acute no obvious rashes  Neurological: Alert, Moves all extremities, no cranial nerve defects on limited exam  Extremities: Pulse 2+ in b/l LE.  No edema. No cyanosis.       Data:  Laboratory studies:  Recent Results (from the past 24 hour(s))   EKG    Collection Time: 02/14/20  9:26 AM "   Result Value Ref Range    Report       [facility] Emergency Dept.    Test Date:  2020  Pt Name:    CORI HODGE               Department: ER  MRN:        [id number]                      Room:       RD 07  Gender:     Male                         Technician: [id number]  :        [id number]                   Requested By:ER TRIAGE PROTOCOL  Order #:    [id number]                    Reading MD:    Measurements  Intervals                                Axis  Rate:       61                           P:          -9  IA:         164                          QRS:        -41  QRSD:       110                          T:          35  QT:         376  QTc:        379    Interpretive Statements  SINUS RHYTHM  PROBABLE LEFT ATRIAL ABNORMALITY  INCOMPLETE RBBB AND LAFB  CONSIDER ANTEROSEPTAL INFARCT  Compared to ECG 2012 15:01:54  Left anterior fascicular block now present  Incomplete right bundle-branch block now present  Right bundle-branch block now present  Myocardial infarct finding now present  Sinus sun ycardia no longer present  Intraventricular conduction delay no longer present     Prothrombin Time    Collection Time: 20 10:15 AM   Result Value Ref Range    PT 13.1 12.0 - 14.6 sec    INR 0.97 0.87 - 1.13   APTT    Collection Time: 20 10:15 AM   Result Value Ref Range    APTT 21.2 (L) 24.7 - 36.0 sec   TROPONIN    Collection Time: 20 10:15 AM   Result Value Ref Range    Troponin T 298 (H) 6 - 19 ng/L   CBC WITH DIFFERENTIAL    Collection Time: 20 10:15 AM   Result Value Ref Range    WBC 17.5 (H) 4.8 - 10.8 K/uL    RBC 5.43 4.70 - 6.10 M/uL    Hemoglobin 17.2 14.0 - 18.0 g/dL    Hematocrit 51.0 42.0 - 52.0 %    MCV 93.9 81.4 - 97.8 fL    MCH 31.7 27.0 - 33.0 pg    MCHC 33.7 33.7 - 35.3 g/dL    RDW 44.2 35.9 - 50.0 fL    Platelet Count 196 164 - 446 K/uL    MPV 9.7 9.0 - 12.9 fL    Neutrophils-Polys 65.30 44.00 - 72.00 %    Lymphocytes 24.20 22.00 - 41.00 %    Monocytes  8.00 0.00 - 13.40 %    Eosinophils 0.90 0.00 - 6.90 %    Basophils 0.70 0.00 - 1.80 %    Immature Granulocytes 0.90 0.00 - 0.90 %    Nucleated RBC 0.00 /100 WBC    Neutrophils (Absolute) 11.40 (H) 1.82 - 7.42 K/uL    Lymphs (Absolute) 4.22 1.00 - 4.80 K/uL    Monos (Absolute) 1.40 (H) 0.00 - 0.85 K/uL    Eos (Absolute) 0.16 0.00 - 0.51 K/uL    Baso (Absolute) 0.12 0.00 - 0.12 K/uL    Immature Granulocytes (abs) 0.16 (H) 0.00 - 0.11 K/uL    NRBC (Absolute) 0.00 K/uL   Basic Metabolic Panel    Collection Time: 02/14/20 10:15 AM   Result Value Ref Range    Sodium 134 (L) 135 - 145 mmol/L    Potassium 4.6 3.6 - 5.5 mmol/L    Chloride 104 96 - 112 mmol/L    Co2 21 20 - 33 mmol/L    Glucose 169 (H) 65 - 99 mg/dL    Bun 17 8 - 22 mg/dL    Creatinine 1.12 0.50 - 1.40 mg/dL    Calcium 9.2 8.5 - 10.5 mg/dL    Anion Gap 9.0 0.0 - 11.9   ESTIMATED GFR    Collection Time: 02/14/20 10:15 AM   Result Value Ref Range    GFR If African American >60 >60 mL/min/1.73 m 2    GFR If Non African American >60 >60 mL/min/1.73 m 2       Imaging:  DX-CHEST-PORTABLE (1 VIEW)   Final Result         Mild diffuse interstitial prominence could relate to hypoventilatory change or mild edema.      OUTSIDE IMAGES-DX CHEST   Final Result      OUTSIDE IMAGES-DX CHEST   Final Result              EKG : personally reviewed by me sinus rhythm, anterior Q waves,    All pertinent features of laboratory and imaging reviewed including primary images where applicable    Assessment / Plan:  64-year-old male patient with history of hypertension, diabetes mellitus, active smoking, obesity, sleep apnea, COPD, coronary artery disease, prior CABG presents with non-ST elevation MI.  -Recommend treatment with aspirin, statin, intravenous heparin.  -Discussed medical therapy versus coronary angiogram and possible stenting.  Patient is agreeable to proceed with coronary angiogram with possible PCI.  Risks and benefits are discussed.  Patient is willing to take  medications regularly.  -He will be scheduled for Cath Lab today.      It is my pleasure to participate in the care of Mr. Ramirez.  Please do not hesitate to contact me with questions or concerns.    Michael Dior M.D.    2/14/2020

## 2020-02-14 NOTE — PROCEDURES
DATE OF SERVICE:  02/14/2020    REFERRING PHYSICIAN:  Michael Dior MD    PROCEDURES:  1.  Coronary angiography.  2.  Graft angiography.  3.  PTCA/stent placement of the proximal saphenous vein graft to the obtuse   marginal branch.  4.  PTCA/stent placement of the ostial saphenous vein graft to the obtuse   marginal branch.  5.  Left ventriculogram.  6.  Monitor conscious sedation.    PREPROCEDURE DIAGNOSES:  1.  Non-ST elevation myocardial infarction.  2.  History of coronary artery bypass graft.    POSTPROCEDURE DIAGNOSES:  1.  Multivessel coronary artery disease with high-grade proximal left anterior   descending artery stenosis with mid to distal vessel filling via patent left   internal mammary artery graft, high-grade ostial to proximal and mid diagonal   branch, occluded proximal left circumflex artery with obtuse marginal filling   via saphenous vein graft, occluded proximal right coronary artery with distal   vessel filling via saphenous vein graft with graft disease including critical   ostial and high-grade proximal saphenous vein graft to the obtuse marginal   branch, patent left internal mammary artery to left anterior descending   artery, occluded saphenous vein graft to the diagonal branch, patent saphenous   vein graft to the right coronary artery disease with long diffuse chronic   stenosis from proximal to mid and additional high-grade stenosis of the mid to   distal vessel.  2.  Successful percutaneous transluminal coronary angioplasty/stent placement   of the proximal saphenous vein graft to the obtuse marginal branch with 3.5x18   mm Lincoln drug-eluting stent.  3.  Successful percutaneous transluminal coronary angioplasty/stent placement   of the ostial to proximal saphenous vein graft to the obtuse marginal branch   with 3.5x26 mm Lincoln drug-eluting stent.  4.  Mildly reduced left ventricular systolic function with ejection fraction   of 45%, diaphragmatic hypokinesis.  5.  Normal left  ventricular end-diastolic pressure.    INDICATION:  The patient is a 64-year-old  with past medical history   significant for 4-vessel coronary artery bypass graft surgery by Dr. Marv Rodriguez at [facility] in 2007.  He was transferred from [facility] for non-STEMI.    DESCRIPTION OF PROCEDURE:  The patient was brought to the cardiac   catheterization laboratory.  He was prepped and draped in the usual sterile   manner.  The right inguinal area was anesthetized with 2% Xylocaine.  A   4-Mongolian sheath was inserted into the right femoral artery using the modified   Seldinger technique under ultrasound guidance.  A 4-Mongolian pigtail catheter   was positioned into the left ventricle.  Left angiography was performed.  This   was exchanged for a 4-Mongolian JL 3.5 catheter, which was positioned into the   left main coronary artery.  Coronary angiography was performed.  This catheter   was exchanged for a JR4 catheter, which was positioned into the right   coronary artery.  Coronary angiography was performed.  These catheters were   used to engage the saphenous vein graft to the diagonal branch, obtuse   marginal branch and left internal mammary artery and graft angiographies were   performed.  This catheter was exchanged for a 4-Mongolian multipurpose catheter,   which was positioned into the saphenous vein graft to the right coronary   artery.  Graft angiography was performed.  This catheter was removed.  The   case was discussed with Dr. Dior.  The sheath was upgraded to 6-Mongolian.    IV Angiomax was started.  A JR4 guide catheter was positioned into the   saphenous vein graft to the obtuse marginal branch.  A Fielder XT wire was   used to cross identify stenosis.  A 1.2x12 mm  and 2.5x15 mm Emerge   balloons were used to predilate the identified stenosis.  A 3.5x18 mm Lincoln   drug-eluting stent was successfully positioned and deployed in the proximal   portion.  A 3.5x26 Critz  drug-eluting stent was successfully positioned and   deployed in the ostial-proximal portion with overlap.  These stents were   postdilated with 3.5x20 mm NC Emerge balloon.  The patient tolerated the   procedure well.  At the end of procedure, all wires, balloons, guide, and   sheaths were removed.  The right femoral arteriotomy site was closed via the   Angio-Seal system.  He was given oral Plavix and transferred to telemetry in   stable condition.    HEMODYNAMIC DATA:  Hemodynamic data shows aortic pressures of 130/80 mmHg  with mean of 90 mmHg and /0 mmHg with LVEDP of 10 mmHg.    AORTIC VALVE:  There was no significant gradient noted.    LEFT VENTRICULOGRAM:  A 10 mL of contrast was delivered for 3 seconds.    Ejection fraction was estimated to be 45%.  There was diaphragmatic   hypokinesis noted.    ANGIOGRAM:  LEFT MAIN CORONARY ARTERY:  Left main coronary artery is a short, large   caliber vessel free of disease.  LEFT ANTERIOR DESCENDING ARTERY:  Left anterior descending artery is a long,   moderate-to-large caliber vessel, which wraps around the apex.  Proximal   portion of the vessel, there is a concentric 90% stenosis.  Mid to distal   portion of the vessel fills via patent left internal mammary artery graft.    There is a long moderate-caliber diagonal branch, which originates distal to   the proximal left anterior descending artery stenosis.  The vessel itself   contains ostial to proximal concentric 90% stenosis and mid concentric 90%   stenosis.  LEFT CIRCUMFLEX ARTERY:  Left circumflex artery is a nondominant vessel, which   is occluded at the ostium.  Distally, there is a long moderate caliber obtuse   marginal branch, which fills via patent saphenous vein graft.  RIGHT CORONARY ARTERY:  Right coronary artery is a dominant vessel, which is   occluded at the proximal portion.  Distal portion of the vessel fills via   patent saphenous vein graft.  GRAFT:  Left internal mammary artery to the  left anterior descending artery:    Patent.  Saphenous vein graft to the diagonal branch:  Occluded at the aortic   anastomosis.  Saphenous vein graft to the obtuse marginal branch critical 99%   stenosis of the ostial to proximal portion.  A concentric 90% stenosis at the   proximal portion.  Saphenous vein graft to the right coronary artery:  Patent   with long diffuse severe stenosis from proximal to mid portion including 99%   in various parts.  Mid to distal portion, there is an eccentric 90% stenosis.    Distally, there is a moderate caliber posterior descending artery filling   without stenosis.    PERCUTANEOUS INTERVENTION:  1.  Ostial 99% complex stenosis of the saphenous vein graft to the obtuse   marginal branch with 0% residual.  Predilatation with 1.2x12 mm ,   2.5x15 mm Emerge and 3.0x20 mm NC Emerge balloon.  Stent with 3.5x26 mm Lincoln   drug-eluting stent, postdilatation with 3.5x20 mm NC Emerge balloon.  2.  Proximal 90% stenosis of the saphenous vein to the obtuse marginal branch.    Predilatation with 2.5x15 mm Emerge balloon.  Stent with 2.5x18 mm Lincoln   Synergy drug-eluting stent.  Postdilatation with 3.5x20 mm NC Emerge balloon.    IMPRESSION:  1.  Multivessel coronary artery disease with high-grade proximal left anterior   descending artery stenosis with mid to distal vessel filling via patent left   internal mammary artery graft, high-grade ostial to proximal and mid diagonal   branch, occluded proximal left circumflex artery with obtuse marginal filling   via saphenous vein graft, occluded proximal right coronary artery with distal   vessel filling via saphenous vein graft with graft disease including critical   ostial and high-grade proximal saphenous vein graft to the obtuse marginal   branch, patent left internal mammary artery to left anterior descending   artery, occluded saphenous vein graft to the diagonal branch, patent saphenous   vein graft to the right coronary artery  disease with long diffuse chronic   stenosis from proximal to mid and additional high-grade stenosis of the mid to   distal vessel.  2.  Successful percutaneous transluminal coronary angioplasty/stent placement   of the proximal saphenous vein graft to the obtuse marginal branch with 3.5x18   mm [place] drug-eluting stent.  3.  Successful percutaneous transluminal coronary angioplasty/stent placement   of the ostial to proximal saphenous vein graft to the obtuse marginal branch   with 3.5x26 mm Lincoln drug-eluting stent.  4.  Mildly reduced left ventricular systolic function with ejection fraction   of 45%, diaphragmatic hypokinesis.  5.  Normal left ventricular end-diastolic pressure.    RECOMMENDATIONS:  Recommend medical therapy with addition of Plavix.    SEDATION TIME: The patient's sedation was managed by myself with continuous   face to face time with the patient for 15 minutes from 11:40 to 11:55.           ____________________________________     MD PARK NELSON / JANETT    DD:  02/14/2020 13:26:34  DT:  02/14/2020 13:50:11    D#:  [id number]  Job#:  [id number]

## 2020-02-14 NOTE — ASSESSMENT & PLAN NOTE
-Substernal chest pain, worse with exertion but also present at rest, radiation down the arm, history of CABG x4 in 2007  - EKG NSR without significant ST segment elevation  -CXR demonstrating mild diffuse interstitial prominence without consolidation  -Troponin 298  - stent placement x2 of the obtuse marginal branch.  EF 45% with diaphragmatic hypokinesis.  Plan:  - Cardiology following: further recommendations include initiating ASA, statin, and plavix. Appreciate recommendations

## 2020-02-14 NOTE — ED NOTES
Peña from Lab called with critical result of troponin 298 at 1057. Critical lab result read back to Peña.   Dr. Headley notified of critical lab result at 1058.  Critical lab result read back by Dr. Headley.

## 2020-02-14 NOTE — ASSESSMENT & PLAN NOTE
- Likely reactive.   - CXR demonstrated mild diffuse interstitial prominence without consolidation  - No systemic signs of infection, with the exception of the WBC

## 2020-02-14 NOTE — SENIOR ADMIT NOTE
Patient is a 64-year-old male admitted for NSTEMI with substernal chest pain relieved with nitro. History of coronary artery disease status post four-vessel CABG in 2007.  Cardiology consulted.  Appreciate recommendations.  Planning for cardiac cath today    Please refer to Dr. Tran history and physical for complete assessment and plan

## 2020-02-14 NOTE — PROGRESS NOTES
Patient has a diagnosis of nSTEMI. Notes say angiogram today. No cath report yet.    There is no echocardiogram at this time. If cath report does not address LVF, an echocardiogram will need to be done.     Below are the defect free care measures that must be met should patient continue with an ACS diagnosis after angiogram.     It is strongly recommended that if the patient gets a stent in cath lab today, that up until 1700 today, the bedside RN start the meds to beds process so that discharge is not delayed. Please do not call the on call pharmacist overnight to start the process unless of course, an order is placed to discharge the patient overnight.     ACS Measures:    1. ASA prescribed at discharge  2. Beta blockade prescribed at discharge, if patient also has HFrEF (EF less than or equal to 40%), this needs to be one of the three evidence based beta blockers: carvedilol, bisoprolol, Toprol XL  3. High intensity statin prescribed at discharge (atorvastatin 40 mg or rosuvastatin 20 mg)  4. ACE-I or ARB prescribed on discharge for LVEF less than 40%  5. Aldosterone blockade prescribed for patients with EF less than 40% AND history of diabetes mellitus OR history of heart failure, heart failure on presentation or heart failure as an in-hospital event  6. ICR referral order is placed  7. Use the Acute Coronary Syndrome discharge instructions to document that patient has been provided with the contact information for ICR   8. Evaluation of LV systolic function can be by angiogram, or echo before discharge, or within past year, cannot be a future plan for LVSF assessment  9. ACS education is documented daily  10. For any patient who receives a stent, initiate meds to beds: Get the outpatient order for the script from the physician, or the APRN:  • Medication  • Dose  • Route  • Quantity, how many pills in the bottle, (ie for Plavix this would be 30, since it’s once daily; Brilinta would be 60 since it’s twice  daily)  • Refills, most physicians and APRN’s give 11 refills because the patient usually needs to be on the med for one year  • Weekend: Call [phone]: ask for the on-call Anticoagulation Pharmacist to be paged.   • Weekday: call [phone] (anticoagulation clinic)     What if any of the above ACS Measures are contraindicated?    · Request that the discharging provider document the medication/intervention and the contraindication specifically in a progress note  · For example: “no ACE-I meds due to hypotension” is not enough. It needs to say: “No ACE-I, ARNI, ARB due to hypotension”; “No Beta Blockade due to bradycardia”…

## 2020-02-15 PROBLEM — I50.9 HEART FAILURE (HCC): Status: ACTIVE | Noted: 2020-01-01

## 2020-02-15 NOTE — PROGRESS NOTES
Assumed care of pt, beside report received from ROLANDO Gaviria. Updated on POC, call light within reach all fall precautions within place. Bed locked and lowered. Pt instructed to call for assistance before getting up. All questions answered, no other needs at this time.

## 2020-02-15 NOTE — CARE PLAN
Problem: Knowledge Deficit  Goal: Knowledge of disease process/condition, treatment plan, diagnostic tests, and medications will improve  Outcome: PROGRESSING AS EXPECTED    Discussed plan of care for today w/ pt this am, he is A+O x 4, he verbalizes understanding of his plan of care, questions answered. Emotional support given. Reinforcing education frequently this am, especially education concerning bed alarm. Discussed pt's care with cardiology, no cardiology procedures for today, pt given breakfast.       Problem: Pain Management  Goal: Pain level will decrease to patient's comfort goal  Outcome: PROGRESSING AS EXPECTED    Pt asking for pain medication, he does not want tylenol. Called resident, discussed pt's care / pain control with resident. Resident says he will put in orders for something else for pain control. Assessing every four hrs for pain per protocol; see doc flowsheets

## 2020-02-15 NOTE — PROGRESS NOTES
Reason for Consult:  Asked by Dr Hernandez to see this patient with  NSTEMI  Patient's PCP: Pcp Not In Computer    CC:   Chief Complaint   Patient presents with   • Chest Pain   • Abnormal Labs     troponin 2.4       HPI: 64-year-old male patient transferred from  for non-ST elevation MI.  He had history of coronary artery disease, four-vessel bypass surgery at [facility] in 2007, hypertension, diabetes mellitus, chronic back pain.  He had few day history of on and off chest pains, moderate to severe crushing chest pains lasting for few minutes associated with shortness of breath, radiates to entire chest.  Smokes about 1.5 to 2 packs/day, is not going to quit.  He did have few episodes of chest pain this morning, relieved with nitro.    Interval History  2/15/2020: NSTEMI, stents to SVG-OM with 3.5 x 18 mm Perry ANDRES and stent to ostial to proximal SVG-OM with 3.5 x 26 mm Lincoln ANDRES, EF 45%.  Should discharge tomorrow          Medications / Drug list prior to admission:  No current facility-administered medications on file prior to encounter.      Current Outpatient Medications on File Prior to Encounter   Medication Sig Dispense Refill   • DULoxetine (CYMBALTA) 30 MG Cap DR Particles Take 30 mg by mouth every day.     • ibuprofen (MOTRIN) 800 MG Tab Take 800-1,600 mg by mouth every 8 hours as needed.     • gabapentin (NEURONTIN) 400 MG Cap Take 400 mg by mouth 3 times a day.         Current list of administered Medications:    Current Facility-Administered Medications:   •  aspirin EC (ECOTRIN) tablet 81 mg, 81 mg, Oral, DAILY, Baudilio Torres M.D., 81 mg at 02/15/20 0432  •  atorvastatin (LIPITOR) tablet 80 mg, 80 mg, Oral, Q EVENING, Baudilio Torres M.D., 80 mg at 02/14/20 2194  •  enalapril (VASOTEC) tablet 2.5 mg, 2.5 mg, Oral, TWICE DAILY, Baudilio Torres M.D., 2.5 mg at 02/15/20 0433  •  DULoxetine (CYMBALTA) capsule 30 mg, 30 mg, Oral, DAILY, Baudilio Torres M.D., 30 mg at 02/15/20 0432  •  gabapentin  (NEURONTIN) capsule 400 mg, 400 mg, Oral, TID, Baudilio Torres M.D., 400 mg at 02/15/20 0432  •  NS infusion, , Intravenous, Continuous, MARNI Parsons, Last Rate: 100 mL/hr at 02/15/20 0058  •  senna-docusate (PERICOLACE or SENOKOT S) 8.6-50 MG per tablet 2 Tab, 2 Tab, Oral, BID, 2 Tab at 02/14/20 1413 **AND** polyethylene glycol/lytes (MIRALAX) PACKET 1 Packet, 1 Packet, Oral, QDAY PRN **AND** magnesium hydroxide (MILK OF MAGNESIA) suspension 30 mL, 30 mL, Oral, QDAY PRN **AND** bisacodyl (DULCOLAX) suppository 10 mg, 10 mg, Rectal, QDAY PRN, Baudilio Torres M.D.  •  Respiratory Therapy Consult, , Nebulization, Continuous RT, Baudilio Torres M.D.  •  acetaminophen (TYLENOL) tablet 650 mg, 650 mg, Oral, Q6HRS PRN, Baudilio Torres M.D.  •  enalaprilat (VASOTEC) injection 1.25 mg, 1.25 mg, Intravenous, Q6HRS PRN, Baudilio Torres M.D.  •  nicotine (NICODERM) 21 MG/24HR 21 mg, 21 mg, Transdermal, Daily-0600, 21 mg at 02/15/20 0432 **AND** Nicotine Replacement Patient Education Materials, , , Once **AND** nicotine polacrilex (NICORETTE) 2 MG piece 2 mg, 2 mg, Oral, Q HOUR PRN, Baudilio Torres M.D.  •  thiamine tablet 100 mg, 100 mg, Oral, DAILY, Baudilio Torres M.D., 100 mg at 02/15/20 0433  •  guaiFENesin ER (MUCINEX) tablet 1,200 mg, 1,200 mg, Oral, BID, Baudilio Torres M.D., 1,200 mg at 02/15/20 0433  •  carvedilol (COREG) tablet 3.125 mg, 3.125 mg, Oral, BID WITH MEALS, Baudilio Torres M.D., 3.125 mg at 02/14/20 1745  •  bivalirudin (ANGIOMAX) 250 mg in NS 50 mL Infusion, 0.2 mg/kg/hr, Intravenous, Continuous, Merrill Edwards M.D., Stopped at 02/14/20 1740  •  clopidogrel (PLAVIX) tablet 75 mg, 75 mg, Oral, DAILY, Merrill Edwards M.D., 75 mg at 02/15/20 0433  •  albuterol (PROVENTIL) 2.5mg/0.5ml nebulizer solution 2.5 mg, 2.5 mg, Nebulization, Q4H PRN (RT), Baudilio Torres M.D.  •  menthol-methyl salicylate (IcyHot\BENGAY) ointment, , Topical, QHS, Leticia Whitney M.D.    Past Medical History:   Diagnosis Date   •  Alcohol abuse, unspecified    • Hemorrhoids    • High cholesterol    • Hx of CABG    • Hypertension    • Neuropathy (HCC)    • S/P diskectomy December 2010    anterior cervical diskectomy abd fusion   • Seizure disorder (HCC)        Past Surgical History:   Procedure Laterality Date   • OTHER CARDIAC SURGERY  3/2012       No family history of premature coronary artery disease.    Social History     Socioeconomic History   • Marital status: Single     Spouse name: Not on file   • Number of children: Not on file   • Years of education: Not on file   • Highest education level: Not on file   Occupational History   • Not on file   Social Needs   • Financial resource strain: Not on file   • Food insecurity     Worry: Not on file     Inability: Not on file   • Transportation needs     Medical: Not on file     Non-medical: Not on file   Tobacco Use   • Smoking status: Current Every Day Smoker   Substance and Sexual Activity   • Alcohol use: Not on file   • Drug use: Not on file   • Sexual activity: Not on file   Lifestyle   • Physical activity     Days per week: Not on file     Minutes per session: Not on file   • Stress: Not on file   Relationships   • Social connections     Talks on phone: Not on file     Gets together: Not on file     Attends Shinto service: Not on file     Active member of club or organization: Not on file     Attends meetings of clubs or organizations: Not on file     Relationship status: Not on file   • Intimate partner violence     Fear of current or ex partner: Not on file     Emotionally abused: Not on file     Physically abused: Not on file     Forced sexual activity: Not on file   Other Topics Concern   • Not on file   Social History Narrative   • Not on file       ALLERGIES:  No Known Allergies    Review of systems:  A detailed review of symptoms was reviewed with patient. This is reviewed in H&P and PMH. ALL OTHERS reviewed and negative    Physical exam:  Patient Vitals for the past 24  "hrs:   BP Pulse Resp Height Weight   20 143/74 68 18 -- --   20 -- -- -- 1.905 m (6' 3\") 115.7 kg (255 lb)     General: No acute distress.   EYES: no jaundice  HEENT: OP clear   Neck: No bruits No JVD.   CVS:   RRR. S1 + S2. No M/R/G  Resp: CTAB. No wheezing or crackles/rhonchi.  Abdomen: Soft, NT, ND,  Skin: Grossly nothing acute no obvious rashes  Neurological: Alert, Moves all extremities, no cranial nerve defects on limited exam  Extremities: Pulse 2+ in b/l LE.  No edema. No cyanosis.       Data:  Laboratory studies:  Recent Results (from the past 24 hour(s))   EKG    Collection Time: 20  9:26 AM   Result Value Ref Range    Report       [facility] Emergency Dept.    Test Date:  2020  Pt Name:    CORI HODGE               Department: ER  MRN:        [id number]                      Room:       [facility]  Gender:     Male                         Technician: [id number]  :        [id number]                   Requested By:ER TRIAGE PROTOCOL  Order #:    [id number]                    Reading MD:    Measurements  Intervals                                Axis  Rate:       61                           P:          -9  WI:         164                          QRS:        -41  QRSD:       110                          T:          35  QT:         376  QTc:        379    Interpretive Statements  SINUS RHYTHM  PROBABLE LEFT ATRIAL ABNORMALITY  INCOMPLETE RBBB AND LAFB  CONSIDER ANTEROSEPTAL INFARCT  Compared to ECG 2012 15:01:54  Left anterior fascicular block now present  Incomplete right bundle-branch block now present  Right bundle-branch block now present  Myocardial infarct finding now present  Sinus sun ycardia no longer present  Intraventricular conduction delay no longer present     Prothrombin Time    Collection Time: 20 10:15 AM   Result Value Ref Range    PT 13.1 12.0 - 14.6 sec    INR 0.97 0.87 - 1.13   APTT    Collection Time: 20 10:15 AM   Result " Value Ref Range    APTT 21.2 (L) 24.7 - 36.0 sec   TROPONIN    Collection Time: 02/14/20 10:15 AM   Result Value Ref Range    Troponin T 298 (H) 6 - 19 ng/L   CBC WITH DIFFERENTIAL    Collection Time: 02/14/20 10:15 AM   Result Value Ref Range    WBC 17.5 (H) 4.8 - 10.8 K/uL    RBC 5.43 4.70 - 6.10 M/uL    Hemoglobin 17.2 14.0 - 18.0 g/dL    Hematocrit 51.0 42.0 - 52.0 %    MCV 93.9 81.4 - 97.8 fL    MCH 31.7 27.0 - 33.0 pg    MCHC 33.7 33.7 - 35.3 g/dL    RDW 44.2 35.9 - 50.0 fL    Platelet Count 196 164 - 446 K/uL    MPV 9.7 9.0 - 12.9 fL    Neutrophils-Polys 65.30 44.00 - 72.00 %    Lymphocytes 24.20 22.00 - 41.00 %    Monocytes 8.00 0.00 - 13.40 %    Eosinophils 0.90 0.00 - 6.90 %    Basophils 0.70 0.00 - 1.80 %    Immature Granulocytes 0.90 0.00 - 0.90 %    Nucleated RBC 0.00 /100 WBC    Neutrophils (Absolute) 11.40 (H) 1.82 - 7.42 K/uL    Lymphs (Absolute) 4.22 1.00 - 4.80 K/uL    Monos (Absolute) 1.40 (H) 0.00 - 0.85 K/uL    Eos (Absolute) 0.16 0.00 - 0.51 K/uL    Baso (Absolute) 0.12 0.00 - 0.12 K/uL    Immature Granulocytes (abs) 0.16 (H) 0.00 - 0.11 K/uL    NRBC (Absolute) 0.00 K/uL   Basic Metabolic Panel    Collection Time: 02/14/20 10:15 AM   Result Value Ref Range    Sodium 134 (L) 135 - 145 mmol/L    Potassium 4.6 3.6 - 5.5 mmol/L    Chloride 104 96 - 112 mmol/L    Co2 21 20 - 33 mmol/L    Glucose 169 (H) 65 - 99 mg/dL    Bun 17 8 - 22 mg/dL    Creatinine 1.12 0.50 - 1.40 mg/dL    Calcium 9.2 8.5 - 10.5 mg/dL    Anion Gap 9.0 0.0 - 11.9   TSH    Collection Time: 02/14/20 10:15 AM   Result Value Ref Range    TSH 0.940 0.380 - 5.330 uIU/mL   ESTIMATED GFR    Collection Time: 02/14/20 10:15 AM   Result Value Ref Range    GFR If African American >60 >60 mL/min/1.73 m 2    GFR If Non African American >60 >60 mL/min/1.73 m 2   DIAGNOSTIC ALCOHOL    Collection Time: 02/14/20  2:08 PM   Result Value Ref Range    Diagnostic Alcohol 0.00 0.00 g/dL   EKG STAT    Collection Time: 02/14/20  2:22 PM   Result Value  Ref Range    Report       [facility]    Test Date:  2020  Pt Name:    CORI HODGE               Department: 183  MRN:        [id number]                      Room:       T815  Gender:     Male                         Technician: [facility]  :        1955                   Requested By:RESHMA LOCKE  Order #:    [id number]                    Reading MD: Praveen Fajardo MD    Measurements  Intervals                                Axis  Rate:       66                           P:          64  OH:         187                          QRS:        -47  QRSD:       106                          T:          57  QT:         379  QTc:        398    Interpretive Statements  SINUS RHYTHM  PROBABLE LEFT ATRIAL ENLARGEMENT  INCOMPLETE RBBB AND LAFB  ANTERIOR INFARCT, OLD  Compared to ECG 2020 09:26:26  No significant changes  Electronically Signed On 2020 15:23:38 PST by Praveen Fajardo MD     CBC with Differential    Collection Time: 02/15/20  2:20 AM   Result Value Ref Range    WBC 16.8 (H) 4.8 - 10.8 K/uL    RBC 5.03 4.70 - 6.10 M/uL    Hemoglobin 16.1 14.0 - 18.0 g/dL    Hematocrit 47.3 42.0 - 52.0 %    MCV 94.0 81.4 - 97.8 fL    MCH 32.0 27.0 - 33.0 pg    MCHC 34.0 33.7 - 35.3 g/dL    RDW 44.0 35.9 - 50.0 fL    Platelet Count 186 164 - 446 K/uL    MPV 9.9 9.0 - 12.9 fL    Neutrophils-Polys 69.50 44.00 - 72.00 %    Lymphocytes 19.20 (L) 22.00 - 41.00 %    Monocytes 8.50 0.00 - 13.40 %    Eosinophils 1.10 0.00 - 6.90 %    Basophils 0.60 0.00 - 1.80 %    Immature Granulocytes 1.10 (H) 0.00 - 0.90 %    Nucleated RBC 0.00 /100 WBC    Neutrophils (Absolute) 11.66 (H) 1.82 - 7.42 K/uL    Lymphs (Absolute) 3.22 1.00 - 4.80 K/uL    Monos (Absolute) 1.43 (H) 0.00 - 0.85 K/uL    Eos (Absolute) 0.19 0.00 - 0.51 K/uL    Baso (Absolute) 0.10 0.00 - 0.12 K/uL    Immature Granulocytes (abs) 0.19 (H) 0.00 - 0.11 K/uL    NRBC (Absolute) 0.00 K/uL   Comp Metabolic Panel (CMP)    Collection Time: 02/15/20  2:20 AM    Result Value Ref Range    Sodium 133 (L) 135 - 145 mmol/L    Potassium 4.4 3.6 - 5.5 mmol/L    Chloride 107 96 - 112 mmol/L    Co2 18 (L) 20 - 33 mmol/L    Anion Gap 8.0 0.0 - 11.9    Glucose 166 (H) 65 - 99 mg/dL    Bun 24 (H) 8 - 22 mg/dL    Creatinine 1.14 0.50 - 1.40 mg/dL    Calcium 8.6 8.5 - 10.5 mg/dL    AST(SGOT) 11 (L) 12 - 45 U/L    ALT(SGPT) 17 2 - 50 U/L    Alkaline Phosphatase 47 30 - 99 U/L    Total Bilirubin 0.4 0.1 - 1.5 mg/dL    Albumin 3.7 3.2 - 4.9 g/dL    Total Protein 6.8 6.0 - 8.2 g/dL    Globulin 3.1 1.9 - 3.5 g/dL    A-G Ratio 1.2 g/dL   Lipid Profile (Tomorrow AM)    Collection Time: 02/15/20  2:20 AM   Result Value Ref Range    Cholesterol,Tot 195 100 - 199 mg/dL    Triglycerides 221 (H) 0 - 149 mg/dL    HDL 31 (A) >=40 mg/dL     (H) <100 mg/dL   ESTIMATED GFR    Collection Time: 02/15/20  2:20 AM   Result Value Ref Range    GFR If African American >60 >60 mL/min/1.73 m 2    GFR If Non African American >60 >60 mL/min/1.73 m 2       Imaging:  DX-CHEST-PORTABLE (1 VIEW)   Final Result         Mild diffuse interstitial prominence could relate to hypoventilatory change or mild edema.      OUTSIDE IMAGES-DX CHEST   Final Result      OUTSIDE IMAGES-DX CHEST   Final Result      CL-LEFT HEART CATHETERIZATION WITH POSSIBLE INTERVENTION    (Results Pending)   EC-ECHOCARDIOGRAM COMPLETE W/O CONT    (Results Pending)           EKG : personally reviewed by me sinus rhythm, anterior Q waves,    All pertinent features of laboratory and imaging reviewed including primary images where applicable    Assessment / Plan:  64-year-old male patient with history of hypertension, diabetes mellitus, active smoking, obesity, sleep apnea, COPD, coronary artery disease, prior CABG presents with non-ST elevation MI.  -Recommend treatment with aspirin, statin, carvedilol 3.125 mg twice daily, clopidogrel 75 mg daily, enalapril 2.5 mg twice daily  -Status post 2 stents.  -Home tomorrow      It is my pleasure to  participate in the care of Mr. Ramirez.  Please do not hesitate to contact me with questions or concerns.        CRISTA Webb  [facility]  [phone]    No future appointments.    Please note that this dictation was created using voice recognition software. I have worked with consultants from the vendor as well as technical experts from [facility] to optimize the interface. I have made every reasonable attempt to correct obvious errors, but I expect that there are errors of grammar and possibly content I did not discover before finalizing the note.

## 2020-02-15 NOTE — PROGRESS NOTES
Received care of pt from NOC RN this am. Pt is A+O x 4. Asking for pain medication. Refusing offer of tylenol po prn. Pt frequently attempting to get up in room, setting off bed alarm. When pt get up his gait is unsteady and he appears SOB. He denies SOB. His O2 sat this am is 95% on RA. Frequently re-educated pt concerning bed alarm, the use of bed alarm and calling for staff with the call light when in need of assistance. Pt verbalizes understanding when given this education and denies questions, but he is frequently up in room and the bed alarm is frequently going off. Frequently in to reinforce education. Pt asking to refuse bed alarm, educated concerning fall risk and fall risk precautions, he verbalizes understanding. Bed alarm remains on & in place.

## 2020-02-15 NOTE — PROGRESS NOTES
Reason for Consult:  Asked by Dr Hernandez to see this patient with  NSTEMI  Patient's PCP: Pcp Not In Computer    CC:   Chief Complaint   Patient presents with   • Chest Pain   • Abnormal Labs     troponin 2.4       HPI: 64-year-old male patient transferred from [facility] for non-ST elevation MI.  He had history of coronary artery disease, four-vessel bypass surgery at [facility] in 2007, hypertension, diabetes mellitus, chronic back pain.  He had few day history of on and off chest pains, moderate to severe crushing chest pains lasting for few minutes associated with shortness of breath, radiates to entire chest.  Smokes about 1.5 to 2 packs/day, is not going to quit.  He did have few episodes of chest pain this morning, relieved with nitro.    Interval history:   2/15/2020: Patient resting comfortably in bed.  He denies chest pain, palpitations, shortness of breath, dizziness, and headaches.  He has some orthopnea related to his HAMMAD.  He uses CPAP at night.  We discussed the importance of smoking cessation.  He is going to work on cutting back and ultimately complete abstinence.  He is aware of the importance of taking his medications daily and consistently.    Telemetry: SR 70-90s r trigem, PVCs, PACs  Labs: Na 133, K 4.4, BUN 24, Cr 1.14, GFR >60  , , HDL 31,   Troponin 298    Medications / Drug list prior to admission:  No current facility-administered medications on file prior to encounter.      Current Outpatient Medications on File Prior to Encounter   Medication Sig Dispense Refill   • DULoxetine (CYMBALTA) 30 MG Cap DR Particles Take 30 mg by mouth every day.     • ibuprofen (MOTRIN) 800 MG Tab Take 800-1,600 mg by mouth every 8 hours as needed.     • gabapentin (NEURONTIN) 400 MG Cap Take 400 mg by mouth 3 times a day.         Current list of administered Medications:    Current Facility-Administered Medications:   •  aspirin EC (ECOTRIN) tablet 81 mg, 81 mg, Oral, DAILY, Baudilio DILLARD  ROLANDO Torres, 81 mg at 02/15/20 0432  •  atorvastatin (LIPITOR) tablet 80 mg, 80 mg, Oral, Q EVENING, Baudilio Torres M.D., 80 mg at 02/14/20 1744  •  enalapril (VASOTEC) tablet 2.5 mg, 2.5 mg, Oral, TWICE DAILY, Baudilio Torres M.D., 2.5 mg at 02/15/20 0433  •  DULoxetine (CYMBALTA) capsule 30 mg, 30 mg, Oral, DAILY, Baudilio Torres M.D., 30 mg at 02/15/20 0432  •  gabapentin (NEURONTIN) capsule 400 mg, 400 mg, Oral, TID, Baudilio Torres M.D., 400 mg at 02/15/20 0432  •  NS infusion, , Intravenous, Continuous, Leeanne Carvajal, A.P.N., Last Rate: 100 mL/hr at 02/15/20 0058  •  senna-docusate (PERICOLACE or SENOKOT S) 8.6-50 MG per tablet 2 Tab, 2 Tab, Oral, BID, 2 Tab at 02/14/20 1413 **AND** polyethylene glycol/lytes (MIRALAX) PACKET 1 Packet, 1 Packet, Oral, QDAY PRN **AND** magnesium hydroxide (MILK OF MAGNESIA) suspension 30 mL, 30 mL, Oral, QDAY PRN **AND** bisacodyl (DULCOLAX) suppository 10 mg, 10 mg, Rectal, QDAY PRN, Baudilio Torres M.D.  •  Respiratory Therapy Consult, , Nebulization, Continuous RT, Baudilio Torres M.D.  •  acetaminophen (TYLENOL) tablet 650 mg, 650 mg, Oral, Q6HRS PRN, Baudilio Torres M.D.  •  enalaprilat (VASOTEC) injection 1.25 mg, 1.25 mg, Intravenous, Q6HRS PRN, Baudilio Torres M.D.  •  nicotine (NICODERM) 21 MG/24HR 21 mg, 21 mg, Transdermal, Daily-0600, 21 mg at 02/15/20 0432 **AND** Nicotine Replacement Patient Education Materials, , , Once **AND** nicotine polacrilex (NICORETTE) 2 MG piece 2 mg, 2 mg, Oral, Q HOUR PRN, Baudilio Torres M.D.  •  thiamine tablet 100 mg, 100 mg, Oral, DAILY, Baudilio Torres M.D., 100 mg at 02/15/20 0433  •  guaiFENesin ER (MUCINEX) tablet 1,200 mg, 1,200 mg, Oral, BID, Baudilio Torres M.D., 1,200 mg at 02/15/20 0433  •  carvedilol (COREG) tablet 3.125 mg, 3.125 mg, Oral, BID WITH MEALS, Baudilio Torres M.D., Stopped at 02/15/20 0730  •  bivalirudin (ANGIOMAX) 250 mg in NS 50 mL Infusion, 0.2 mg/kg/hr, Intravenous, Continuous, Merrill Edwards M.D., Stopped at  02/14/20 1740  •  clopidogrel (PLAVIX) tablet 75 mg, 75 mg, Oral, DAILY, Merrill Edwards M.D., 75 mg at 02/15/20 0433  •  albuterol (PROVENTIL) 2.5mg/0.5ml nebulizer solution 2.5 mg, 2.5 mg, Nebulization, Q4H PRN (RT), Baudilio Torres M.D.  •  menthol-methyl salicylate (IcyHot\BENGAY) ointment, , Topical, QHS, Leticia Whitney M.D.    Past Medical History:   Diagnosis Date   • Alcohol abuse, unspecified    • Hemorrhoids    • High cholesterol    • Hx of CABG    • Hypertension    • Neuropathy (HCC)    • S/P diskectomy December 2010    anterior cervical diskectomy abd fusion   • Seizure disorder (HCC)        Past Surgical History:   Procedure Laterality Date   • OTHER CARDIAC SURGERY  3/2012       No family history of premature coronary artery disease.    Social History     Socioeconomic History   • Marital status: Single     Spouse name: Not on file   • Number of children: Not on file   • Years of education: Not on file   • Highest education level: Not on file   Occupational History   • Not on file   Social Needs   • Financial resource strain: Not on file   • Food insecurity     Worry: Not on file     Inability: Not on file   • Transportation needs     Medical: Not on file     Non-medical: Not on file   Tobacco Use   • Smoking status: Current Every Day Smoker   Substance and Sexual Activity   • Alcohol use: Not on file   • Drug use: Not on file   • Sexual activity: Not on file   Lifestyle   • Physical activity     Days per week: Not on file     Minutes per session: Not on file   • Stress: Not on file   Relationships   • Social connections     Talks on phone: Not on file     Gets together: Not on file     Attends [employer] service: Not on file     Active member of club or organization: Not on file     Attends meetings of clubs or organizations: Not on file     Relationship status: Not on file   • Intimate partner violence     Fear of current or ex partner: Not on file     Emotionally abused: Not on file     Physically  "abused: Not on file     Forced sexual activity: Not on file   Other Topics Concern   • Not on file   Social History Narrative   • Not on file       ALLERGIES:  No Known Allergies    Review of systems:  A detailed review of symptoms was reviewed with patient. This is reviewed in H&P and PMH. ALL OTHERS reviewed and negative    Physical exam:  Patient Vitals for the past 24 hrs:   BP Pulse Resp Height Weight   20 0922 143/74 68 18 -- --   20 0920 -- -- -- 1.905 m (6' 3\") 115.7 kg (255 lb)     General: No acute distress.   EYES: no jaundice  HEENT: OP clear   Neck: No bruits No JVD.   CVS:   RRR. S1 + S2. No M/R/G  Resp: CTAB. No wheezing or crackles/rhonchi.  Abdomen: Soft, NT, ND,  Skin: Grossly nothing acute no obvious rashes, PVD color changes, Right patrice/femoral site CDI with gauze and Tegaderm dressing, no hematoma, no ecchymosis  Neurological: Alert, Moves all extremities, no cranial nerve defects on limited exam  Extremities: Pulse 2+ in b/l LE.  No edema. No cyanosis.       Data:  Laboratory studies:  Recent Results (from the past 24 hour(s))   EKG    Collection Time: 20  9:26 AM   Result Value Ref Range    Report       [facility] Emergency Dept.    Test Date:  2020  Pt Name:    CORI HODGE               Department: ER  MRN:        [id number]                      Room:       [facility]  Gender:     Male                         Technician: [id number]  :        1955                   Requested By:ER TRIAGE PROTOCOL  Order #:    [id number]                    Reading MD:    Measurements  Intervals                                Axis  Rate:       61                           P:          -9  VT:         164                          QRS:        -41  QRSD:       110                          T:          35  QT:         376  QTc:        379    Interpretive Statements  SINUS RHYTHM  PROBABLE LEFT ATRIAL ABNORMALITY  INCOMPLETE RBBB AND LAFB  CONSIDER ANTEROSEPTAL " INFARCT  Compared to ECG 07/25/2012 15:01:54  Left anterior fascicular block now present  Incomplete right bundle-branch block now present  Right bundle-branch block now present  Myocardial infarct finding now present  Sinus sun ycardia no longer present  Intraventricular conduction delay no longer present     Prothrombin Time    Collection Time: 02/14/20 10:15 AM   Result Value Ref Range    PT 13.1 12.0 - 14.6 sec    INR 0.97 0.87 - 1.13   APTT    Collection Time: 02/14/20 10:15 AM   Result Value Ref Range    APTT 21.2 (L) 24.7 - 36.0 sec   TROPONIN    Collection Time: 02/14/20 10:15 AM   Result Value Ref Range    Troponin T 298 (H) 6 - 19 ng/L   CBC WITH DIFFERENTIAL    Collection Time: 02/14/20 10:15 AM   Result Value Ref Range    WBC 17.5 (H) 4.8 - 10.8 K/uL    RBC 5.43 4.70 - 6.10 M/uL    Hemoglobin 17.2 14.0 - 18.0 g/dL    Hematocrit 51.0 42.0 - 52.0 %    MCV 93.9 81.4 - 97.8 fL    MCH 31.7 27.0 - 33.0 pg    MCHC 33.7 33.7 - 35.3 g/dL    RDW 44.2 35.9 - 50.0 fL    Platelet Count 196 164 - 446 K/uL    MPV 9.7 9.0 - 12.9 fL    Neutrophils-Polys 65.30 44.00 - 72.00 %    Lymphocytes 24.20 22.00 - 41.00 %    Monocytes 8.00 0.00 - 13.40 %    Eosinophils 0.90 0.00 - 6.90 %    Basophils 0.70 0.00 - 1.80 %    Immature Granulocytes 0.90 0.00 - 0.90 %    Nucleated RBC 0.00 /100 WBC    Neutrophils (Absolute) 11.40 (H) 1.82 - 7.42 K/uL    Lymphs (Absolute) 4.22 1.00 - 4.80 K/uL    Monos (Absolute) 1.40 (H) 0.00 - 0.85 K/uL    Eos (Absolute) 0.16 0.00 - 0.51 K/uL    Baso (Absolute) 0.12 0.00 - 0.12 K/uL    Immature Granulocytes (abs) 0.16 (H) 0.00 - 0.11 K/uL    NRBC (Absolute) 0.00 K/uL   Basic Metabolic Panel    Collection Time: 02/14/20 10:15 AM   Result Value Ref Range    Sodium 134 (L) 135 - 145 mmol/L    Potassium 4.6 3.6 - 5.5 mmol/L    Chloride 104 96 - 112 mmol/L    Co2 21 20 - 33 mmol/L    Glucose 169 (H) 65 - 99 mg/dL    Bun 17 8 - 22 mg/dL    Creatinine 1.12 0.50 - 1.40 mg/dL    Calcium 9.2 8.5 - 10.5 mg/dL     Anion Gap 9.0 0.0 - 11.9   TSH    Collection Time: 20 10:15 AM   Result Value Ref Range    TSH 0.940 0.380 - 5.330 uIU/mL   ESTIMATED GFR    Collection Time: 20 10:15 AM   Result Value Ref Range    GFR If African American >60 >60 mL/min/1.73 m 2    GFR If Non African American >60 >60 mL/min/1.73 m 2   DIAGNOSTIC ALCOHOL    Collection Time: 20  2:08 PM   Result Value Ref Range    Diagnostic Alcohol 0.00 0.00 g/dL   EKG STAT    Collection Time: 20  2:22 PM   Result Value Ref Range    Report       [facility]    Test Date:  2020  Pt Name:    CORI HODGE               Department: [facility]  MRN:        [id number]                      Room:       [facility]  Gender:     Male                         Technician: CECILIA  :        1955                   Requested By:RESHMA LOCKE  Order #:    [id number]                    Reading MD: Praveen Fajardo MD    Measurements  Intervals                                Axis  Rate:       66                           P:          64  HI:         187                          QRS:        -47  QRSD:       106                          T:          57  QT:         379  QTc:        398    Interpretive Statements  SINUS RHYTHM  PROBABLE LEFT ATRIAL ENLARGEMENT  INCOMPLETE RBBB AND LAFB  ANTERIOR INFARCT, OLD  Compared to ECG 2020 09:26:26  No significant changes  Electronically Signed On 2020 15:23:38 PST by Praveen Fajardo MD     CBC with Differential    Collection Time: 02/15/20  2:20 AM   Result Value Ref Range    WBC 16.8 (H) 4.8 - 10.8 K/uL    RBC 5.03 4.70 - 6.10 M/uL    Hemoglobin 16.1 14.0 - 18.0 g/dL    Hematocrit 47.3 42.0 - 52.0 %    MCV 94.0 81.4 - 97.8 fL    MCH 32.0 27.0 - 33.0 pg    MCHC 34.0 33.7 - 35.3 g/dL    RDW 44.0 35.9 - 50.0 fL    Platelet Count 186 164 - 446 K/uL    MPV 9.9 9.0 - 12.9 fL    Neutrophils-Polys 69.50 44.00 - 72.00 %    Lymphocytes 19.20 (L) 22.00 - 41.00 %    Monocytes 8.50 0.00 - 13.40 %    Eosinophils 1.10 0.00  - 6.90 %    Basophils 0.60 0.00 - 1.80 %    Immature Granulocytes 1.10 (H) 0.00 - 0.90 %    Nucleated RBC 0.00 /100 WBC    Neutrophils (Absolute) 11.66 (H) 1.82 - 7.42 K/uL    Lymphs (Absolute) 3.22 1.00 - 4.80 K/uL    Monos (Absolute) 1.43 (H) 0.00 - 0.85 K/uL    Eos (Absolute) 0.19 0.00 - 0.51 K/uL    Baso (Absolute) 0.10 0.00 - 0.12 K/uL    Immature Granulocytes (abs) 0.19 (H) 0.00 - 0.11 K/uL    NRBC (Absolute) 0.00 K/uL   Comp Metabolic Panel (CMP)    Collection Time: 02/15/20  2:20 AM   Result Value Ref Range    Sodium 133 (L) 135 - 145 mmol/L    Potassium 4.4 3.6 - 5.5 mmol/L    Chloride 107 96 - 112 mmol/L    Co2 18 (L) 20 - 33 mmol/L    Anion Gap 8.0 0.0 - 11.9    Glucose 166 (H) 65 - 99 mg/dL    Bun 24 (H) 8 - 22 mg/dL    Creatinine 1.14 0.50 - 1.40 mg/dL    Calcium 8.6 8.5 - 10.5 mg/dL    AST(SGOT) 11 (L) 12 - 45 U/L    ALT(SGPT) 17 2 - 50 U/L    Alkaline Phosphatase 47 30 - 99 U/L    Total Bilirubin 0.4 0.1 - 1.5 mg/dL    Albumin 3.7 3.2 - 4.9 g/dL    Total Protein 6.8 6.0 - 8.2 g/dL    Globulin 3.1 1.9 - 3.5 g/dL    A-G Ratio 1.2 g/dL   Lipid Profile (Tomorrow AM)    Collection Time: 02/15/20  2:20 AM   Result Value Ref Range    Cholesterol,Tot 195 100 - 199 mg/dL    Triglycerides 221 (H) 0 - 149 mg/dL    HDL 31 (A) >=40 mg/dL     (H) <100 mg/dL   ESTIMATED GFR    Collection Time: 02/15/20  2:20 AM   Result Value Ref Range    GFR If African American >60 >60 mL/min/1.73 m 2    GFR If Non African American >60 >60 mL/min/1.73 m 2       Imaging:  DX-CHEST-PORTABLE (1 VIEW)   Final Result         Mild diffuse interstitial prominence could relate to hypoventilatory change or mild edema.      OUTSIDE IMAGES-DX CHEST   Final Result      OUTSIDE IMAGES-DX CHEST   Final Result      CL-LEFT HEART CATHETERIZATION WITH POSSIBLE INTERVENTION    (Results Pending)   EC-ECHOCARDIOGRAM COMPLETE W/O CONT    (Results Pending)     Cardiac catheterization 2/14/2020  IMPRESSION:  1.  Multivessel coronary artery disease  with high-grade proximal left anterior   descending artery stenosis with mid to distal vessel filling via patent left   internal mammary artery graft, high-grade ostial to proximal and mid diagonal   branch, occluded proximal left circumflex artery with obtuse marginal filling   via saphenous vein graft, occluded proximal right coronary artery with distal   vessel filling via saphenous vein graft with graft disease including critical   ostial and high-grade proximal saphenous vein graft to the obtuse marginal   branch, patent left internal mammary artery to left anterior descending   artery, occluded saphenous vein graft to the diagonal branch, patent saphenous   vein graft to the right coronary artery disease with long diffuse chronic   stenosis from proximal to mid and additional high-grade stenosis of the mid to   distal vessel.  2.  Successful percutaneous transluminal coronary angioplasty/stent placement   of the proximal saphenous vein graft to the obtuse marginal branch with 3.5x18   mm Lincoln drug-eluting stent.  3.  Successful percutaneous transluminal coronary angioplasty/stent placement   of the ostial to proximal saphenous vein graft to the obtuse marginal branch   with 3.5x26 mm Starbuck drug-eluting stent.  4.  Mildly reduced left ventricular systolic function with ejection fraction   of 45%, diaphragmatic hypokinesis.  5.  Normal left ventricular end-diastolic pressure.    Echo 2/15/2020  Pending    EKG : personally reviewed by me sinus rhythm, anterior Q waves,    All pertinent features of laboratory and imaging reviewed including primary images where applicable    Assessment / Plan:  64-year-old male patient with history of hypertension, diabetes mellitus, active smoking, obesity, sleep apnea, COPD, coronary artery disease, prior CABG presents with non-ST elevation MI.    -Continue ASA 81 mg, atorvastatin 80 mg every evening, carvedilol 8.125 mg twice daily, clopidogrel 75 mg daily, enalapril 2.5 mg twice  daily  -EF 45% per cath  -Echo pending  -Chest pain-free      It is my pleasure to participate in the care of Mr. Ramirez.  Please do not hesitate to contact me with questions or concerns.        CRISTA Webb  [facility]  [phone]    No future appointments.    Please note that this dictation was created using voice recognition software. I have worked with consultants from the vendor as well as technical experts from [facility] to optimize the interface. I have made every reasonable attempt to correct obvious errors, but I expect that there are errors of grammar and possibly content I did not discover before finalizing the note.

## 2020-02-15 NOTE — ASSESSMENT & PLAN NOTE
- Estimated ejection fraction is 45% with diaphragmatic hypokinesis  -HFpEF  Plan:  -continue to follow up with Cardiology, as outpatient   -carvedilol, asa, beta blocker, ACEi and statin was initiated

## 2020-02-15 NOTE — RESPIRATORY CARE
COPD EDUCATION by COPD CLINICAL EDUCATOR  2/15/2020 at 9:45 AM by Lilo Vick, RRT     Patient interviewed by COPD education team. Patient refused COPD program at this time.

## 2020-02-15 NOTE — THERAPY
"Physical Therapy Evaluation completed.   Bed Mobility:  Supine to Sit: Independent  Transfers: Sit to Stand: Independent  Gait: Level Of Assist: Supervised with Quad Cane       Plan of Care: Patient with no further skilled PT needs in the acute care setting at this time  Discharge Recommendations: Equipment: No Equipment Needed.Recommend outpatient physical therapy services to address higher level deficits (cardiac Rehab)    Assessment: 63 yo M presents with impaired activity tolerance s/p NSTEMI, cath and stent placement. Patient lives alone in an apt and uses a quad cane at [facility]. Able to ambualte 400' Mark today with cueing for pacing. Attempted to provide cardiac rehab education. Patient is tangential and minimally receptive of education. Simplified to using the talk test with gait and the importance of progressive mobilization. Patient appears to be at his baseline function. No further acute PT needs at this time. Would recommend follow  up at OP cardiac rehab.     See \"Rehab Therapy-Acute\" Patient Summary Report for complete documentation.     Cynthia Tatum, PT, DPT, GCS  "

## 2020-02-15 NOTE — CARE PLAN
Problem: Communication  Goal: The ability to communicate needs accurately and effectively will improve  Outcome: PROGRESSING AS EXPECTED  Intervention: [place] patient and significant other/support system to call light to alert staff of needs  Flowsheets (Taken 2/14/2020 4970)  Oriented to:: All of the Following : Location of Bathroom, Visiting Policy, Unit Routine, Call Light and Bedside Controls, Bedside Rail Policy, Smoking Policy, Rights and Responsibilities, Bedside Report, and Patient Education Notebook  Note: Pt educated on POC and medications. Pt verbalized understanding.      Problem: Infection  Goal: Will remain free from infection  Outcome: PROGRESSING AS EXPECTED  Intervention: Assess signs and symptoms of infection  Note: Assessed pt for signs and symptoms of infection hand hygiene performed before and after pt encounter.

## 2020-02-15 NOTE — PROGRESS NOTES
Daily Progress Note:     Date of Service: 2/15/2020  Primary Team: CHELSEYR ARIADNA White Team   Attending: Dr. Gore   Senior Resident: Dr. Torres  Intern: Dr. Tran  Contact:  [phone]    Chief Complaint:   N-STEMI    Subjective:   No acute events overnight.  Patient denies CP. Chronic back pain well controlled with oxycodone. Patient denies chest pain.  During catheterization patient required stent placement x2 of the obtuse marginal branch.  Patient's estimated ejection fraction is 45% with diaphragmatic hypokinesis.    Consultants/Specialty:  Cardiology    Review of Systems:    Review of Systems   Constitutional: Negative for chills, fever, malaise/fatigue and weight loss.   HENT: Negative for congestion, ear discharge, sinus pain and sore throat.    Eyes: Negative for blurred vision, double vision, pain and discharge.   Respiratory: Positive for cough (Chronic). Negative for hemoptysis, sputum production, shortness of breath and wheezing.    Cardiovascular: Negative for chest pain, palpitations, orthopnea and leg swelling.   Gastrointestinal: Negative for abdominal pain, blood in stool, diarrhea, melena, nausea and vomiting.   Genitourinary: Negative for dysuria, flank pain, frequency and hematuria.   Musculoskeletal: Negative for falls and myalgias.   Skin: Negative for rash.   Neurological: Negative for dizziness, tingling, focal weakness, loss of consciousness, weakness and headaches.   Endo/Heme/Allergies: Negative for polydipsia. Does not bruise/bleed easily.   Psychiatric/Behavioral: Negative for hallucinations, memory loss, substance abuse and suicidal ideas. The patient is not nervous/anxious.        Objective Data:   Physical Exam:   Vitals:   Temp:  [36.4 °C (97.6 °F)-36.7 °C (98 °F)] 36.7 °C (98 °F)  Pulse:  [] 70  Resp:  [15-30] 19  BP: (102-195)/() 102/69  SpO2:  [87 %-98 %] 94 %    Physical Exam  Vitals signs and nursing note reviewed.   Constitutional:       General: He is not in acute  distress.     Appearance: Normal appearance. He is obese.   HENT:      Head: Normocephalic and atraumatic.      Right Ear: Ear canal normal.      Left Ear: Ear canal normal.      Nose: Nose normal. No rhinorrhea.      Mouth/Throat:      Pharynx: Oropharynx is clear. No oropharyngeal exudate or posterior oropharyngeal erythema.   Eyes:      General: No scleral icterus.     Extraocular Movements: Extraocular movements intact.      Conjunctiva/sclera: Conjunctivae normal.      Pupils: Pupils are equal, round, and reactive to light.   Neck:      Musculoskeletal: Normal range of motion and neck supple.   Cardiovascular:      Rate and Rhythm: Normal rate and regular rhythm.      Pulses: Normal pulses.      Heart sounds: Normal heart sounds. No murmur.   Pulmonary:      Effort: Pulmonary effort is normal. No respiratory distress.      Breath sounds: Normal breath sounds. No wheezing or rhonchi.   Abdominal:      General: Bowel sounds are normal. There is no distension.      Palpations: Abdomen is soft. There is no mass.      Tenderness: There is no abdominal tenderness. There is no guarding.      Hernia: A hernia (reducible umbilical) is present.   Musculoskeletal: Normal range of motion.         General: No swelling, tenderness or deformity.      Right lower leg: No edema.      Left lower leg: No edema.   Lymphadenopathy:      Cervical: No cervical adenopathy.   Skin:     General: Skin is warm and dry.      Coloration: Skin is not jaundiced.      Findings: No erythema or rash.   Neurological:      General: No focal deficit present.      Mental Status: He is alert and oriented to person, place, and time.      Cranial Nerves: No cranial nerve deficit.      Sensory: No sensory deficit.      Motor: No weakness.      Coordination: Coordination normal.   Psychiatric:         Mood and Affect: Mood normal.         Behavior: Behavior normal.         Thought Content: Thought content normal.           Labs:   Results for orders  placed or performed during the hospital encounter of 02/14/20   Prothrombin Time   Result Value Ref Range    PT 13.1 12.0 - 14.6 sec    INR 0.97 0.87 - 1.13   APTT   Result Value Ref Range    APTT 21.2 (L) 24.7 - 36.0 sec   TROPONIN   Result Value Ref Range    Troponin T 298 (H) 6 - 19 ng/L   CBC WITH DIFFERENTIAL   Result Value Ref Range    WBC 17.5 (H) 4.8 - 10.8 K/uL    RBC 5.43 4.70 - 6.10 M/uL    Hemoglobin 17.2 14.0 - 18.0 g/dL    Hematocrit 51.0 42.0 - 52.0 %    MCV 93.9 81.4 - 97.8 fL    MCH 31.7 27.0 - 33.0 pg    MCHC 33.7 33.7 - 35.3 g/dL    RDW 44.2 35.9 - 50.0 fL    Platelet Count 196 164 - 446 K/uL    MPV 9.7 9.0 - 12.9 fL    Neutrophils-Polys 65.30 44.00 - 72.00 %    Lymphocytes 24.20 22.00 - 41.00 %    Monocytes 8.00 0.00 - 13.40 %    Eosinophils 0.90 0.00 - 6.90 %    Basophils 0.70 0.00 - 1.80 %    Immature Granulocytes 0.90 0.00 - 0.90 %    Nucleated RBC 0.00 /100 WBC    Neutrophils (Absolute) 11.40 (H) 1.82 - 7.42 K/uL    Lymphs (Absolute) 4.22 1.00 - 4.80 K/uL    Monos (Absolute) 1.40 (H) 0.00 - 0.85 K/uL    Eos (Absolute) 0.16 0.00 - 0.51 K/uL    Baso (Absolute) 0.12 0.00 - 0.12 K/uL    Immature Granulocytes (abs) 0.16 (H) 0.00 - 0.11 K/uL    NRBC (Absolute) 0.00 K/uL   Basic Metabolic Panel   Result Value Ref Range    Sodium 134 (L) 135 - 145 mmol/L    Potassium 4.6 3.6 - 5.5 mmol/L    Chloride 104 96 - 112 mmol/L    Co2 21 20 - 33 mmol/L    Glucose 169 (H) 65 - 99 mg/dL    Bun 17 8 - 22 mg/dL    Creatinine 1.12 0.50 - 1.40 mg/dL    Calcium 9.2 8.5 - 10.5 mg/dL    Anion Gap 9.0 0.0 - 11.9   TSH   Result Value Ref Range    TSH 0.940 0.380 - 5.330 uIU/mL   ESTIMATED GFR   Result Value Ref Range    GFR If African American >60 >60 mL/min/1.73 m 2    GFR If Non African American >60 >60 mL/min/1.73 m 2   DIAGNOSTIC ALCOHOL   Result Value Ref Range    Diagnostic Alcohol 0.00 0.00 g/dL   CBC with Differential   Result Value Ref Range    WBC 16.8 (H) 4.8 - 10.8 K/uL    RBC 5.03 4.70 - 6.10 M/uL     Hemoglobin 16.1 14.0 - 18.0 g/dL    Hematocrit 47.3 42.0 - 52.0 %    MCV 94.0 81.4 - 97.8 fL    MCH 32.0 27.0 - 33.0 pg    MCHC 34.0 33.7 - 35.3 g/dL    RDW 44.0 35.9 - 50.0 fL    Platelet Count 186 164 - 446 K/uL    MPV 9.9 9.0 - 12.9 fL    Neutrophils-Polys 69.50 44.00 - 72.00 %    Lymphocytes 19.20 (L) 22.00 - 41.00 %    Monocytes 8.50 0.00 - 13.40 %    Eosinophils 1.10 0.00 - 6.90 %    Basophils 0.60 0.00 - 1.80 %    Immature Granulocytes 1.10 (H) 0.00 - 0.90 %    Nucleated RBC 0.00 /100 WBC    Neutrophils (Absolute) 11.66 (H) 1.82 - 7.42 K/uL    Lymphs (Absolute) 3.22 1.00 - 4.80 K/uL    Monos (Absolute) 1.43 (H) 0.00 - 0.85 K/uL    Eos (Absolute) 0.19 0.00 - 0.51 K/uL    Baso (Absolute) 0.10 0.00 - 0.12 K/uL    Immature Granulocytes (abs) 0.19 (H) 0.00 - 0.11 K/uL    NRBC (Absolute) 0.00 K/uL   Comp Metabolic Panel (CMP)   Result Value Ref Range    Sodium 133 (L) 135 - 145 mmol/L    Potassium 4.4 3.6 - 5.5 mmol/L    Chloride 107 96 - 112 mmol/L    Co2 18 (L) 20 - 33 mmol/L    Anion Gap 8.0 0.0 - 11.9    Glucose 166 (H) 65 - 99 mg/dL    Bun 24 (H) 8 - 22 mg/dL    Creatinine 1.14 0.50 - 1.40 mg/dL    Calcium 8.6 8.5 - 10.5 mg/dL    AST(SGOT) 11 (L) 12 - 45 U/L    ALT(SGPT) 17 2 - 50 U/L    Alkaline Phosphatase 47 30 - 99 U/L    Total Bilirubin 0.4 0.1 - 1.5 mg/dL    Albumin 3.7 3.2 - 4.9 g/dL    Total Protein 6.8 6.0 - 8.2 g/dL    Globulin 3.1 1.9 - 3.5 g/dL    A-G Ratio 1.2 g/dL   Lipid Profile (Tomorrow AM)   Result Value Ref Range    Cholesterol,Tot 195 100 - 199 mg/dL    Triglycerides 221 (H) 0 - 149 mg/dL    HDL 31 (A) >=40 mg/dL     (H) <100 mg/dL   ESTIMATED GFR   Result Value Ref Range    GFR If African American >60 >60 mL/min/1.73 m 2    GFR If Non African American >60 >60 mL/min/1.73 m 2   EKG   Result Value Ref Range    Report       [facility]    Test Date:  2020-02-14  Pt Name:    CORI HODGE               Department: ER  MRN:        [id number]                       Room:        07  Gender:     Male                         Technician: [id number]  :        1955                   Requested By:ER TRIAGE PROTOCOL  Order #:    [id number]                    Reading MD:    Measurements  Intervals                                Axis  Rate:       61                           P:          -9  RI:         164                          QRS:        -41  QRSD:       110                          T:          35  QT:         376  QTc:        379    Interpretive Statements  SINUS RHYTHM  PROBABLE LEFT ATRIAL ABNORMALITY  INCOMPLETE RBBB AND LAFB  CONSIDER ANTEROSEPTAL INFARCT  Compared to ECG 2012 15:01:54  Left anterior fascicular block now present  Incomplete right bundle-branch block now present  Right bundle-branch block now present  Myocardial infarct finding now present  Sinus sun ycardia no longer present  Intraventricular conduction delay no longer present     EKG STAT   Result Value Ref Range    Report       [facility]    Test Date:  2020  Pt Name:    CORI HODGE               Department: 183  MRN:        [id number]                      Room:       15  Gender:     Male                         Technician: [id number]  :        1955                   Requested By:RESHMA LOCKE  Order #:    [id number]                    Reading MD: Praveen Fajardo MD    Measurements  Intervals                                Axis  Rate:       66                           P:          64  RI:         187                          QRS:        -47  QRSD:       106                          T:          57  QT:         379  QTc:        398    Interpretive Statements  SINUS RHYTHM  PROBABLE LEFT ATRIAL ENLARGEMENT  INCOMPLETE RBBB AND LAFB  ANTERIOR INFARCT, OLD  Compared to ECG 2020 09:26:26  No significant changes  Electronically Signed On 2020 15:23:38 PST by Praveen Fajardo MD         Imaging:   DX-CHEST-PORTABLE (1 VIEW)   Final Result         Mild diffuse  interstitial prominence could relate to hypoventilatory change or mild edema.      OUTSIDE IMAGES-DX CHEST   Final Result      OUTSIDE IMAGES-DX CHEST   Final Result      CL-LEFT HEART CATHETERIZATION WITH POSSIBLE INTERVENTION    (Results Pending)   EC-ECHOCARDIOGRAM COMPLETE W/O CONT    (Results Pending)         * Non-STEMI (non-ST elevated myocardial infarction) (HCC)  Assessment & Plan  -Substernal chest pain, worse with exertion but also present at rest, radiation down the arm, history of CABG x4 in 2007  - EKG NSR without significant ST segment elevation  -CXR demonstrating mild diffuse interstitial prominence without consolidation  -Troponin 298  - stent placement x2 of the obtuse marginal branch.  EF 45% with diaphragmatic hypokinesis.  Plan:  - Cardiology following: further recommendations include initiating ASA, statin, and plavix. Appreciate recommendations    Leukocytosis- (present on admission)  Assessment & Plan  - Likely reactive.   - CXR demonstrated mild diffuse interstitial prominence without consolidation  - No systemic signs of infection, with the exception of the WBC      Heart failure (HCC)  Assessment & Plan  - Estimated ejection fraction is 45% with diaphragmatic hypokinesis  -HFpEF  Plan:  -continue to follow up with Cardiology, as outpatient   -carvedilol, asa, beta blocker, ACEi and statin was initiated       Tobacco use  Assessment & Plan  - Significant history of tobacco use.  Currently using 1.5-2 packs/day, roughly 70-pack-year history.  - Nicotine transdermal and p.o. available  - Cessation counseling    Obesity  Assessment & Plan  -We will place consultation to nutrition.  Patient may also follow-up with this as an outpatient.    Dyslipidemia  Assessment & Plan  - HDL 31, , [place] 221  - Will initiate statin therapy.       Essential hypertension  Assessment & Plan  - Will initiate carvedilol and ACEi

## 2020-02-16 PROBLEM — E11.9 DIABETES (HCC): Status: ACTIVE | Noted: 2020-01-01

## 2020-02-16 NOTE — DISCHARGE SUMMARY
Discharge Summary    Date of Admission: 2/14/2020  Date of Discharge: No discharge date for patient encounter.  Discharging Attending: Kendra Gore M.D.   Discharging Senior Resident: Dr. Torres  Discharging Intern: Dr. Tran    CHIEF COMPLAINT ON ADMISSION  Chief Complaint   Patient presents with   • Chest Pain   • Abnormal Labs     troponin 2.4       Reason for Admission  N-STEMI    Admission Date  2/14/2020    CODE STATUS  Full Code    HPI & HOSPITAL COURSE  This is a 64 y.o. male here with substernal chest pain and elevated troponins at [facility] emergency room.  Patient was transferred to [facility] for evaluation for urgent catheterization.  Patient underwent catheterization and had 2 stents placed in the obtuse marginal branch.  During cath EF was estimated at 45% with diaphragmatic hypokinesis.  Patient did well post catheter and denies any chest pain/discomfort.  Prior to admission patient was not medically optimized.  Patient was initiated on aspirin, statin, Plavix.  In light of heart failure with preserved ejection fraction patient was also started on carvedilol and lisinopril.  Furthermore hemoglobin A1c was noted to be 8.2 patient was started on metformin.  Tobacco cessation was discussed with the patient at length.  He states that he will try to cut back.  Patient was told to discuss this with his PCP.  Patient was offered nicotine replacement, running clean, and Wellbutrin however denied all. Patient states understands and able to detail back follow up plan for cardiology, pcp and cardiac rehab.        Therefore, he is discharged in good and stable condition to home with close outpatient follow-up.    The patient met 2-midnight criteria for an inpatient stay at the time of discharge.    Discharge Date  2/16/20    FOLLOW UP ITEMS POST DISCHARGE  Patient to follow-up with PCP.  [facility] is a  and [facility]  It is recommended that patient participate in cardiac rehab.  This was  discussed with the patient and he states that he will [facility] with the [facility] about cardiac rehab.    DISCHARGE DIAGNOSES  Principal Problem:    Non-STEMI (non-ST elevated myocardial infarction) (HCC) POA: Unknown  Active Problems:    Leukocytosis POA: Yes    Essential hypertension POA: Unknown    Dyslipidemia POA: Unknown    Obesity POA: Unknown    Tobacco use POA: Unknown    Heart failure (HCC) POA: Unknown    Diabetes (HCC) POA: Unknown  Resolved Problems:    * No resolved hospital problems. *      FOLLOW UP  No future appointments.  [facility]  [address] [address]  [phone]      Please call to schedule your appointment with Liliana Kemp your Primary Care Physician. You will need to get a referral for Cardiology. Thank you       MEDICATIONS ON DISCHARGE     Medication List      ASK your doctor about these medications      Instructions   DULoxetine 30 MG Cpep  Commonly known as:  CYMBALTA   Take 30 mg by mouth every day.  Dose:  30 mg     gabapentin 400 MG Caps  Commonly known as:  NEURONTIN   Take 400 mg by mouth 3 times a day.  Dose:  400 mg     ibuprofen 800 MG Tabs  Commonly known as:  MOTRIN   Take 800-1,600 mg by mouth every 8 hours as needed.  Dose:  800-1,600 mg            Allergies  No Known Allergies    DIET  Orders Placed This Encounter   Procedures   • Diet Order Cardiac     Standing Status:   Standing     Number of Occurrences:   1     Order Specific Question:   Diet:     Answer:   Cardiac [6]       ACTIVITY  As tolerated.  Weight bearing as tolerated    CONSULTATIONS       with Cardiology consulted.  Treatment options were discussed and plan of care agreed upon.    PROCEDURES  Cardiac catheterization status post angioplasty and stent placement

## 2020-02-16 NOTE — DISCHARGE INSTRUCTIONS
Discharge Instructions    Discharged to home by car with relative. Discharged via wheelchair, hospital escort: Yes.  Special equipment needed: Not Applicable    Be sure to schedule a follow-up appointment with your primary care doctor or any specialists as instructed.     Discharge Plan:   Diet Plan: Discussed  Activity Level: Discussed  Smoking Cessation Offered: Patient Refused  Confirmed Follow up Appointment: Appointment Scheduled  Confirmed Symptoms Management: Discussed  Medication Reconciliation Updated: Yes  Influenza Vaccine Indication: Not indicated: Previously immunized this influenza season and > 8 years of age    I understand that a diet low in cholesterol, fat, and sodium is recommended for good health. Unless I have been given specific instructions below for another diet, I accept this instruction as my diet prescription.       Special Instructions: Diagnosis:  Acute Coronary Syndrome (ACS) is a diagnosis that encompasses cardiac-related chest pain and heart attack. ACS occurs when the blood flow to the heart muscle is severely reduced or cut off completely due to a slow process called atherosclerosis.  Atherosclerosis is a disease in which the coronary arteries become narrow from a buildup of fat, cholesterol, and other substances that combine to form plaque. If the plaque breaks, a blood clot will form and block the blood flow to the heart muscle. This lack of blood flow can cause damage or death to the heart muscle which is called a heart attack or Myocardial Infarction (MI). There are two different types of MIs:  ST Elevation Myocardial Infarction or STEMI (the most severe type of heart attack) and Non-ST Elevation Myocardial Infarction or NSTEMI.    Treatment Plan:  · Cardiac Diet  - Low fat, low salt, low cholesterol   · Cardiac Rehab  - Your doctor has ordered you a referral to [facility].  Call [phone] to schedule an appointment.  · Attend my follow-up appointment with my  Cardiologist.  · Take my medications as prescribed by my doctor  · Exercise daily  · Quit Smoking, lower my blood pressure and Reduce stress    Medications:  Certain medications are used to treat ACS.  Remember to always take medications as prescribed and never stop talking medications unless told by your doctor.    You have been prescribed the following medicatons:    Aspirin - Aspirin is used as a blood thinning medication and you will require this medication indefinitely.  Anti-platelet/blood thinner - Your Anti-platelet/Blood thinning medication is called plavix, and is used in combination with aspirin to prevent clots from forming in your heart and/or around your stent.  Your doctor will determine how long you need to be on this medicine.  Beta-Blocker - Beta-Blocker coreg is used to lower blood pressure and heart rate, and/or helps your heart heal after a heart attack.  Statin - Statin lipitor is used to lower cholesterol.    · Is patient discharged on Warfarin / Coumadin?   No     Groin Site Care  Refer to this sheet in the next few weeks. These instructions provide you with information on caring for yourself after your procedure. Your caregiver may also give you more specific instructions. Your treatment has been planned according to current medical practices, but problems sometimes occur. Call your caregiver if you have any problems or questions after your procedure.  HOME CARE INSTRUCTIONS  · You may shower 24 hours after the procedure. Remove the bandage (dressing) and gently wash the site with plain soap and water. Gently pat the site dry.  · Do not apply powder or lotion to the site.  · Do not sit in a bathtub, swimming pool, or whirlpool for 5 to 7 days.  · No bending, squatting, or lifting anything over 10 pounds (4.5 kg) as directed by your caregiver.  · Inspect the site at least twice daily.  · Do not drive home if you are discharged the same day of the procedure. Have someone else drive you.  · You  may drive 24 hours after the procedure unless otherwise instructed by your caregiver.  What to expect:  · Any bruising will usually fade within 1 to 2 weeks.  · Blood that collects in the tissue (hematoma) may be painful to the touch. It should usually decrease in size and tenderness within 1 to 2 weeks.  SEEK IMMEDIATE MEDICAL CARE IF:  · You have unusual pain at the groin site or down the affected leg.  · You have redness, warmth, swelling, or pain at the groin site.  · You have drainage (other than a small amount of blood on the dressing).  · You have chills.  · You have a fever or persistent symptoms for more than 72 hours.  · You have a fever and your symptoms suddenly get worse.  · Your leg becomes pale, cool, tingly, or numb.  · You have heavy bleeding from the site. Hold pressure on the site.  Document Released: 01/20/2012 Document Revised: 03/11/2013 Document Reviewed: 01/20/2012  [employer]® Patient Information ©2014 [employer].    Clopidogrel tablets  What is this medicine?  CLOPIDOGREL (kloh PID oh grel) helps to prevent blood clots. This medicine is used to prevent heart attack, stroke, or other vascular events in people who are at high risk.  This medicine may be used for other purposes; ask your health care provider or pharmacist if you have questions.  COMMON BRAND NAME(S): Plavix  What should I tell my health care provider before I take this medicine?  They need to know if you have any of the following conditions:  -bleeding disorders  -bleeding in the brain  -having surgery  -history of stomach bleeding  -an unusual or allergic reaction to clopidogrel, other medicines, foods, dyes, or preservatives  -pregnant or trying to get pregnant  -breast-feeding  How should I use this medicine?  Take this medicine by mouth with a glass of water. Follow the directions on the prescription label. You may take this medicine with or without food. If it upsets your stomach, take it with food. Take your medicine at  regular intervals. Do not take it more often than directed. Do not stop taking except on your doctor's advice.  A special MedGuide will be given to you by the pharmacist with each prescription and refill. Be sure to read this information carefully each time.  Talk to your pediatrician regarding the use of this medicine in children. Special care may be needed.  Overdosage: If you think you have taken too much of this medicine contact a poison control center or emergency room at once.  NOTE: This medicine is only for you. Do not share this medicine with others.  What if I miss a dose?  If you miss a dose, take it as soon as you can. If it is almost time for your next dose, take only that dose. Do not take double or extra doses.  What may interact with this medicine?  Do not take this medicine with the following medications:  -dasabuvir; ombitasvir; paritaprevir; ritonavir  -defibrotide  This medicine may also interact with the following medications:  -antiviral medicines for HIV or AIDS  -aspirin  -certain medicines for depression like citalopram, fluoxetine, fluvoxamine  -certain medicines for fungal infections like ketoconazole, fluconazole, voriconazole  -certain medicines for seizures like felbamate, oxcarbazepine, phenytoin  -certain medicines for stomach problems like cimetidine, omeprazole, esomeprazole  -certain medicines that treat or prevent blood clots like warfarin, enoxaparin, dalteparin, apixaban, dabigatran, rivaroxaban, ticlopidine  -chloramphenicol  -cilostazol  -fluvastatin  -isoniazid  -modafinil  -nicardipine  -NSAIDS, medicines for pain and inflammation, like ibuprofen or naproxen  -quinine  -repaglinide  -tamoxifen  -tolbutamide  -topiramate  -torsemide  This list may not describe all possible interactions. Give your health care provider a list of all the medicines, herbs, non-prescription drugs, or dietary supplements you use. Also tell them if you smoke, drink alcohol, or use illegal drugs.  Some items may interact with your medicine.  What should I watch for while using this medicine?  Visit your doctor or health care professional for regular check ups. Do not stop taking your medicine unless your doctor tells you to.  Notify your doctor or health care professional and seek emergency treatment if you develop breathing problems; changes in vision; chest pain; severe, sudden headache; pain, swelling, warmth in the leg; trouble speaking; sudden numbness or weakness of the face, arm or leg. These can be signs that your condition has gotten worse.  If you are going to have surgery or dental work, tell your doctor or health care professional that you are taking this medicine.  Certain genetic factors may reduce the effect of this medicine. Your doctor may use genetic tests to determine treatment.  What side effects may I notice from receiving this medicine?  Side effects that you should report to your doctor or health care professional as soon as possible:  -allergic reactions like skin rash, itching or hives, swelling of the face, lips, or tongue  -signs and symptoms of bleeding such as bloody or black, tarry stools; red or dark-brown urine; spitting up blood or brown material that looks like coffee grounds; red spots on the skin; unusual bruising or bleeding from the eye, gums, or nose  -signs and symptoms of a blood clot such as breathing problems; changes in vision; chest pain; severe, sudden headache; pain, swelling, warmth in the leg; trouble speaking; sudden numbness or weakness of the face, arm or leg  Side effects that usually do not require medical attention (report to your doctor or health care professional if they continue or are bothersome):  -constipation  -diarrhea  -headache  -upset stomach  This list may not describe all possible side effects. Call your doctor for medical advice about side effects. You may report side effects to FDA at 9-817-FDA-5947.  Where should I keep my medicine?  Keep  out of the reach of children.  Store at room temperature of 59 to 86 degrees F (15 to 30 degrees C). Throw away any unused medicine after the expiration date.  NOTE: This sheet is a summary. It may not cover all possible information. If you have questions about this medicine, talk to your doctor, pharmacist, or health care provider.  © 2018 [employer]/Gold Standard (2016-09-22 10:00:44)    Metformin; Repaglinide tablets  What is this medicine?  METFORMIN; REPAGLINIDE (met FOR min; re PAG josh zhane) is used to treat type 2 diabetes. Treatment is combined with diet and exercise. This medicine helps your body to use insulin better.  This medicine may be used for other purposes; ask your health care provider or pharmacist if you have questions.  COMMON BRAND NAME(S): Kalia  What should I tell my health care provider before I take this medicine?  They need to know if you have any of these conditions:  -anemia  -frequently drink alcohol-containing beverages  -become easily dehydrated  -heart attack  -heart failure that is treated with medications  -kidney disease  -liver disease  -polycystic ovary syndrome  -serious infection or injury  -vomiting  -an unusual or allergic reaction to metformin, repaglinide, other medicines, foods, dyes, or preservatives  -pregnant or trying to get pregnant  -breast-feeding  How should I use this medicine?  Take this medicine by mouth. Swallow it with a drink of water. Follow the directions on the prescription label. Take this medicine before meals. It should be taken no earlier than 30 minutes before meals. If a meal is skipped, skip the dose for that meal. Do not take more often than directed.  Talk to your pediatrician regarding the use of this medicine in children. Special care may be needed.  Elderly patients over 65 years old may have a stronger reaction and need a smaller dose.  Overdosage: If you think you have taken too much of this medicine contact a poison control center or  emergency room at once.  NOTE: This medicine is only for you. Do not share this medicine with others.  What if I miss a dose?  If you miss a dose before a meal, skip that dose. If it is almost time for your next dose, take only that dose with the next scheduled meal as directed. Do not take double or extra doses.  What may interact with this medicine?  Do not take this medicine with any of the following medications:  -certain contrast medicines given before X-rays, CT scans, MRI, or other procedures  -dofetilide  -gatifloxacin  -gemfibrozil  This medicine may also interact with the following medications:  -acetazolamide  -amiloride  -certain antiviral medicines for HIV infection or hepatitis  -cimetidine  -clarithromycin  -crizotinib  -digoxin  -diuretics  -female hormones, like estrogens or progestins and birth control pills  -glycopyrrolate  -isoniazid  -lamotrigine  -medicines for blood pressure, heart disease, irregular heart beat  -medicines for fungal or yeast infections such as itraconazole, ketoconazole, miconazole  -memantine  -methazolamide  -midodrine  -morphine  -nicotinic acid  -phenothiazines like chlorpromazine, mesoridazine, prochlorperazine, thioridazine  -phenytoin  -procainamide  -propantheline  -quinidine  -quinine  -ranitidine  -ranolazine  -rifampin  -simvastatin  -steroid medicines like prednisone or cortisone  -stimulant medicines for attention disorders, weight loss, or to stay awake  -thyroid medicines  -topiramate  -trimethoprim  -trospium  -vancomycin  -vandetanib  -zonisamide  This list may not describe all possible interactions. Give your health care provider a list of all the medicines, herbs, non-prescription drugs, or dietary supplements you use. Also tell them if you smoke, drink alcohol, or use illegal drugs. Some items may interact with your medicine.  What should I watch for while using this medicine?  Visit your doctor or health care professional for regular checks on your  progress.  A test called the HbA1C (A1C) will be monitored. This is a simple blood test. It measures your blood sugar control over the last 2 to 3 months. You will receive this test every 3 to 6 months.  Learn how to check your blood sugar. Learn the symptoms of low and high blood sugar and how to manage them.  Always carry a quick-source of sugar with you in case you have symptoms of low blood sugar. Examples include hard sugar candy or glucose tablets. Make sure others know that you can choke if you eat or drink when you develop serious symptoms of low blood sugar, such as seizures or unconsciousness. They must get medical help at once.  Tell your doctor or health care professional if you have high blood sugar. You might need to change the dose of your medicine. If you are sick or exercising more than usual, you might need to change the dose of your medicine.  Do not skip meals. Ask your doctor or health care professional if you should avoid alcohol. Many nonprescription cough and cold products contain sugar or alcohol. These can affect blood sugar.  This medicine may cause ovulation in premenopausal women who do not have regular monthly periods. This may increase your chances of becoming pregnant. You should not take this medicine if you become pregnant or think you may be pregnant. Talk with your doctor or health care professional about your birth control options while taking this medicine. Contact your doctor or health care professional right away if think you are pregnant.  If you are going to need surgery, a MRI, CT scan, or other procedure, tell your doctor that you are taking this medicine. You may need to stop taking this medicine before the procedure.  Wear a medical ID bracelet or chain, and carry a card that describes your disease and details of your medicine and dosage times.  What side effects may I notice from receiving this medicine?  Side effects that you should report to your doctor or health care  professional as soon as possible:  -allergic reactions like skin rash, itching or hives, swelling of the face, lips, or tongue  -breathing problems  -dark urine  -fever, chills  -general ill feeling or flu-like symptoms  -muscle aches or pains  -nausea, vomiting  -right upper belly pain  -signs and symptoms of low blood sugar such as feeling anxious, confusion, dizziness, increased hunger, unusually weak or tired, sweating, shakiness, cold, irritable, headache, blurred vision, fast heartbeat, loss of consciousness  -slow or irregular heartbeat  -unusual stomach upset or pain  -unusually tired or weak  -yellowing of the eyes or skin  Side effects that usually do not require medical attention (report to your doctor or health care professional if they continue or are bothersome):  -back pain  -diarrhea  -headache  -heartburn  -joint pain  -nausea  -stomach gas, upset  This list may not describe all possible side effects. Call your doctor for medical advice about side effects. You may report side effects to FDA at 3-578-FDA-9423.  Where should I keep my medicine?  Keep out of the reach of children.  Store at temperatures less than 25 degrees C (77 degrees F). Protect from light. Throw away any unused medicine after the expiration date.  NOTE: This sheet is a summary. It may not cover all possible information. If you have questions about this medicine, talk to your doctor, pharmacist, or health care provider.  © 2018 [employer]/Gold Standard (2015-06-02 21:55:32)    Depression / Suicide Risk    As you are discharged from this [facility] facility, it is important to learn how to keep safe from harming yourself.    Recognize the warning signs:  · Abrupt changes in personality, positive or negative- including increase in energy   · Giving away possessions  · Change in eating patterns- significant weight changes-  positive or negative  · Change in sleeping patterns- unable to sleep or sleeping all the time   · Unwillingness  or inability to communicate  · Depression  · Unusual sadness, discouragement and loneliness  · Talk of wanting to die  · Neglect of personal appearance   · Rebelliousness- reckless behavior  · Withdrawal from people/activities they love  · Confusion- inability to concentrate     If you or a loved one observes any of these behaviors or has concerns about self-harm, here's what you can do:  · Talk about it- your feelings and reasons for harming yourself  · Remove any means that you might use to hurt yourself (examples: pills, rope, extension cords, firearm)  · Get professional help from the community (Mental Health, Substance Abuse, psychological counseling)  · Do not be alone:Call your Safe Contact- someone whom you trust who will be there for you.  · Call your local CRISIS HOTLINE 799-4135 or 712-158-6059  · Call your local Children's Mobile Crisis Response Team [place] (651) 129-4147 or www.Problemcity.com  · Call the toll free National Suicide Prevention Hotlines   · National Suicide Prevention Lifeline 175-542-YBZM (9867)  · National Hope Line Network 800-SUICIDE (163-1447)

## 2020-02-16 NOTE — CARE PLAN
Problem: Safety  Goal: Will remain free from falls  Outcome: PROGRESSING AS EXPECTED   Be alarms in place.  PT does not call for assistance.  SB assist, tolerates well.

## 2020-02-16 NOTE — PROGRESS NOTES
Tele: SR [facility] - , KATINA's, shiraz.   .18 / .10 / .32    Pt medicated for pain control this evening. Denies needs. Will pass care to NOC RN @ [facility]

## 2020-02-16 NOTE — PROGRESS NOTES
Pt asking every RN & CNA that he calls into his room to turn off bed alarm. Reinforced education concerning bed alarm. Discussed pt's care with charge RN.

## 2020-02-16 NOTE — PROGRESS NOTES
Daily Progress Note:     Date of Service: 2/16/2020  Primary Team: [facility] IM White Team   Attending: Dr. Gore   Senior Resident: Dr. Torres  Intern: Dr. Tran  Contact:  [phone]    Chief Complaint:   N-STEMI    Subjective:   No acute events overnight.  Patient denies CP. Chronic back pain well controlled with oxycodone. Patient denies chest pain.  [facility]. Discussed with patient that he should schedule to see his PCP in one week.     Consultants/Specialty:  Cardiology    Review of Systems:    Review of Systems   Constitutional: Negative for chills, fever, malaise/fatigue and weight loss.   HENT: Negative for congestion, ear discharge, sinus pain and sore throat.    Eyes: Negative for blurred vision, double vision, pain and discharge.   Respiratory: Positive for cough (Chronic). Negative for hemoptysis, sputum production, shortness of breath and wheezing.    Cardiovascular: Negative for chest pain, palpitations, orthopnea and leg swelling.   Gastrointestinal: Negative for abdominal pain, blood in stool, diarrhea, melena, nausea and vomiting.   Genitourinary: Negative for dysuria, flank pain, frequency and hematuria.   Musculoskeletal: Negative for falls and myalgias.   Skin: Negative for rash.   Neurological: Negative for dizziness, tingling, focal weakness, loss of consciousness, weakness and headaches.   Endo/Heme/Allergies: Negative for polydipsia. Does not bruise/bleed easily.   Psychiatric/Behavioral: Negative for hallucinations, memory loss, substance abuse and suicidal ideas. The patient is not nervous/anxious.        Objective Data:   Physical Exam:   Vitals:   Temp:  [35.8 °C (96.5 °F)-37 °C (98.6 °F)] 36.4 °C (97.5 °F)  Pulse:  [66-86] 73  Resp:  [17-18] 18  BP: ()/(61-76) 116/76  SpO2:  [95 %-97 %] 97 %    Physical Exam  Vitals signs and nursing note reviewed.   Constitutional:       General: He is not in acute distress.     Appearance: Normal  appearance. He is obese.   HENT:      Head: Normocephalic and atraumatic.      Right Ear: Ear canal normal.      Left Ear: Ear canal normal.      Nose: Nose normal. No rhinorrhea.      Mouth/Throat:      Pharynx: Oropharynx is clear. No oropharyngeal exudate or posterior oropharyngeal erythema.   Eyes:      General: No scleral icterus.     Extraocular Movements: Extraocular movements intact.      Conjunctiva/sclera: Conjunctivae normal.      Pupils: Pupils are equal, round, and reactive to light.   Neck:      Musculoskeletal: Normal range of motion and neck supple.   Cardiovascular:      Rate and Rhythm: Normal rate and regular rhythm.      Pulses: Normal pulses.      Heart sounds: Normal heart sounds. No murmur.   Pulmonary:      Effort: Pulmonary effort is normal. No respiratory distress.      Breath sounds: Normal breath sounds. No wheezing or rhonchi.   Abdominal:      General: Bowel sounds are normal. There is no distension.      Palpations: Abdomen is soft. There is no mass.      Tenderness: There is no abdominal tenderness. There is no guarding.      Hernia: A hernia (reducible umbilical) is present.   Musculoskeletal: Normal range of motion.         General: No swelling, tenderness or deformity.      Right lower leg: No edema.      Left lower leg: No edema.   Lymphadenopathy:      Cervical: No cervical adenopathy.   Skin:     General: Skin is warm and dry.      Coloration: Skin is not jaundiced.      Findings: No erythema or rash.   Neurological:      General: No focal deficit present.      Mental Status: He is alert and oriented to person, place, and time.      Cranial Nerves: No cranial nerve deficit.      Sensory: No sensory deficit.      Motor: No weakness.      Coordination: Coordination normal.   Psychiatric:         Mood and Affect: Mood normal.         Behavior: Behavior normal.         Thought Content: Thought content normal.           Labs:   Results for orders placed or performed during the  hospital encounter of 02/14/20   EC-ECHOCARDIOGRAM COMPLETE W/ CONT   Result Value Ref Range    Left Ventrical Ejection Fraction 60    Prothrombin Time   Result Value Ref Range    PT 13.1 12.0 - 14.6 sec    INR 0.97 0.87 - 1.13   APTT   Result Value Ref Range    APTT 21.2 (L) 24.7 - 36.0 sec   TROPONIN   Result Value Ref Range    Troponin T 298 (H) 6 - 19 ng/L   CBC WITH DIFFERENTIAL   Result Value Ref Range    WBC 17.5 (H) 4.8 - 10.8 K/uL    RBC 5.43 4.70 - 6.10 M/uL    Hemoglobin 17.2 14.0 - 18.0 g/dL    Hematocrit 51.0 42.0 - 52.0 %    MCV 93.9 81.4 - 97.8 fL    MCH 31.7 27.0 - 33.0 pg    MCHC 33.7 33.7 - 35.3 g/dL    RDW 44.2 35.9 - 50.0 fL    Platelet Count 196 164 - 446 K/uL    MPV 9.7 9.0 - 12.9 fL    Neutrophils-Polys 65.30 44.00 - 72.00 %    Lymphocytes 24.20 22.00 - 41.00 %    Monocytes 8.00 0.00 - 13.40 %    Eosinophils 0.90 0.00 - 6.90 %    Basophils 0.70 0.00 - 1.80 %    Immature Granulocytes 0.90 0.00 - 0.90 %    Nucleated RBC 0.00 /100 WBC    Neutrophils (Absolute) 11.40 (H) 1.82 - 7.42 K/uL    Lymphs (Absolute) 4.22 1.00 - 4.80 K/uL    Monos (Absolute) 1.40 (H) 0.00 - 0.85 K/uL    Eos (Absolute) 0.16 0.00 - 0.51 K/uL    Baso (Absolute) 0.12 0.00 - 0.12 K/uL    Immature Granulocytes (abs) 0.16 (H) 0.00 - 0.11 K/uL    NRBC (Absolute) 0.00 K/uL   Basic Metabolic Panel   Result Value Ref Range    Sodium 134 (L) 135 - 145 mmol/L    Potassium 4.6 3.6 - 5.5 mmol/L    Chloride 104 96 - 112 mmol/L    Co2 21 20 - 33 mmol/L    Glucose 169 (H) 65 - 99 mg/dL    Bun 17 8 - 22 mg/dL    Creatinine 1.12 0.50 - 1.40 mg/dL    Calcium 9.2 8.5 - 10.5 mg/dL    Anion Gap 9.0 0.0 - 11.9   TSH   Result Value Ref Range    TSH 0.940 0.380 - 5.330 uIU/mL   ESTIMATED GFR   Result Value Ref Range    GFR If African American >60 >60 mL/min/1.73 m 2    GFR If Non African American >60 >60 mL/min/1.73 m 2   DIAGNOSTIC ALCOHOL   Result Value Ref Range    Diagnostic Alcohol 0.00 0.00 g/dL   CBC with Differential   Result Value Ref Range     WBC 16.8 (H) 4.8 - 10.8 K/uL    RBC 5.03 4.70 - 6.10 M/uL    Hemoglobin 16.1 14.0 - 18.0 g/dL    Hematocrit 47.3 42.0 - 52.0 %    MCV 94.0 81.4 - 97.8 fL    MCH 32.0 27.0 - 33.0 pg    MCHC 34.0 33.7 - 35.3 g/dL    RDW 44.0 35.9 - 50.0 fL    Platelet Count 186 164 - 446 K/uL    MPV 9.9 9.0 - 12.9 fL    Neutrophils-Polys 69.50 44.00 - 72.00 %    Lymphocytes 19.20 (L) 22.00 - 41.00 %    Monocytes 8.50 0.00 - 13.40 %    Eosinophils 1.10 0.00 - 6.90 %    Basophils 0.60 0.00 - 1.80 %    Immature Granulocytes 1.10 (H) 0.00 - 0.90 %    Nucleated RBC 0.00 /100 WBC    Neutrophils (Absolute) 11.66 (H) 1.82 - 7.42 K/uL    Lymphs (Absolute) 3.22 1.00 - 4.80 K/uL    Monos (Absolute) 1.43 (H) 0.00 - 0.85 K/uL    Eos (Absolute) 0.19 0.00 - 0.51 K/uL    Baso (Absolute) 0.10 0.00 - 0.12 K/uL    Immature Granulocytes (abs) 0.19 (H) 0.00 - 0.11 K/uL    NRBC (Absolute) 0.00 K/uL   Comp Metabolic Panel (CMP)   Result Value Ref Range    Sodium 133 (L) 135 - 145 mmol/L    Potassium 4.4 3.6 - 5.5 mmol/L    Chloride 107 96 - 112 mmol/L    Co2 18 (L) 20 - 33 mmol/L    Anion Gap 8.0 0.0 - 11.9    Glucose 166 (H) 65 - 99 mg/dL    Bun 24 (H) 8 - 22 mg/dL    Creatinine 1.14 0.50 - 1.40 mg/dL    Calcium 8.6 8.5 - 10.5 mg/dL    AST(SGOT) 11 (L) 12 - 45 U/L    ALT(SGPT) 17 2 - 50 U/L    Alkaline Phosphatase 47 30 - 99 U/L    Total Bilirubin 0.4 0.1 - 1.5 mg/dL    Albumin 3.7 3.2 - 4.9 g/dL    Total Protein 6.8 6.0 - 8.2 g/dL    Globulin 3.1 1.9 - 3.5 g/dL    A-G Ratio 1.2 g/dL   Lipid Profile (Tomorrow AM)   Result Value Ref Range    Cholesterol,Tot 195 100 - 199 mg/dL    Triglycerides 221 (H) 0 - 149 mg/dL    HDL 31 (A) >=40 mg/dL     (H) <100 mg/dL   HEMOGLOBIN A1C   Result Value Ref Range    Glycohemoglobin 8.2 (H) 0.0 - 5.6 %    Est Avg Glucose 189 mg/dL   ESTIMATED GFR   Result Value Ref Range    GFR If African American >60 >60 mL/min/1.73 m 2    GFR If Non African American >60 >60 mL/min/1.73 m 2   EKG   Result Value Ref Range     Report       [facility] Emergency Dept.    Test Date:  2020  Pt Name:    CORI HODGE               Department: ER  MRN:        [id number]                      Room:       RD 07  Gender:     Male                         Technician: [id number]  :        1955                   Requested By:ER TRIAGE PROTOCOL  Order #:    [id number]                    Reading MD:    Measurements  Intervals                                Axis  Rate:       61                           P:          -9  GA:         164                          QRS:        -41  QRSD:       110                          T:          35  QT:         376  QTc:        379    Interpretive Statements  SINUS RHYTHM  PROBABLE LEFT ATRIAL ABNORMALITY  INCOMPLETE RBBB AND LAFB  CONSIDER ANTEROSEPTAL INFARCT  Compared to ECG 2012 15:01:54  Left anterior fascicular block now present  Incomplete right bundle-branch block now present  Right bundle-branch block now present  Myocardial infarct finding now present  Sinus sun ycardia no longer present  Intraventricular conduction delay no longer present     EKG STAT   Result Value Ref Range    Report       [facility]    Test Date:  2020  Pt Name:    CORI HODGE               Department: 183  MRN:        [id number]                      Room:       T815  Gender:     Male                         Technician: [id number]  :        1955                   Requested By:RESHMA LOCKE  Order #:    [id number]                    Reading MD: Praveen Fajardo MD    Measurements  Intervals                                Axis  Rate:       66                           P:          64  GA:         187                          QRS:        -47  QRSD:       106                          T:          57  QT:         379  QTc:        398    Interpretive Statements  SINUS RHYTHM  PROBABLE LEFT ATRIAL ENLARGEMENT  INCOMPLETE RBBB AND LAFB  ANTERIOR INFARCT, OLD  Compared to ECG 2020 09:26:26  No  significant changes  Electronically Signed On 2- 15:23:38 PST by Praveen Fajardo MD         Imaging:   EC-ECHOCARDIOGRAM COMPLETE W/ CONT   Final Result      DX-CHEST-PORTABLE (1 VIEW)   Final Result         Mild diffuse interstitial prominence could relate to hypoventilatory change or mild edema.      OUTSIDE IMAGES-DX CHEST   Final Result      OUTSIDE IMAGES-DX CHEST   Final Result      CL-LEFT HEART CATHETERIZATION WITH POSSIBLE INTERVENTION    (Results Pending)         * Non-STEMI (non-ST elevated myocardial infarction) ([facility])  Assessment & Plan  -Substernal chest pain, worse with exertion but also present at rest, radiation down the arm, history of CABG x4 in 2007  - EKG NSR without significant ST segment elevation  -CXR demonstrating mild diffuse interstitial prominence without consolidation  -Troponin 298  - stent placement x2 of the obtuse marginal branch.  EF 45% with diaphragmatic hypokinesis.  Plan:  - Cardiology following: further recommendations include initiating ASA, statin, and plavix. Appreciate recommendations    Leukocytosis- (present on admission)  Assessment & Plan  - Likely reactive.   - CXR demonstrated mild diffuse interstitial prominence without consolidation  - No systemic signs of infection, with the exception of the WBC      Diabetes ([facility])  Assessment & Plan  -A1c 8.2  Plan:  -initiated on metformin.  -follow up with PCP    Heart failure ([facility])  Assessment & Plan  - Estimated ejection fraction is 45% with diaphragmatic hypokinesis  -HFpEF  Plan:  -continue to follow up with Cardiology, as outpatient   -carvedilol, asa, beta blocker, ACEi and statin was initiated       Tobacco use  Assessment & Plan  - Significant history of tobacco use.  Currently using 1.5-2 packs/day, roughly 70-pack-year history.  - Nicotine transdermal and p.o. available  - Cessation counseling    Obesity  Assessment & Plan  -We will place consultation to nutrition.  Patient may also follow-up with this as an  outpatient.    Dyslipidemia  Assessment & Plan  - HDL 31, , salina 221  - Will initiate statin therapy.       Essential hypertension  Assessment & Plan  - Will initiate carvedilol and ACEi

## 2020-02-16 NOTE — THERAPY
"Occupational Therapy Evaluation completed.   Functional Status:  Independent functional mobility and transfer with quad cane for standing activities; independent with self-care; patient appears safe to d/c directly home when medically ready   Plan of Care: Patient with no further skilled OT needs in the acute care setting at this time  Discharge Recommendations:  Equipment: No Equipment Needed. Post-acute therapy Currently anticipate no further skilled therapy needs once patient is discharged from the inpatient setting.    See \"Rehab Therapy-Acute\" Patient Summary Report for complete documentation.    "

## 2020-02-16 NOTE — PROGRESS NOTES
"Interval History:  Feels well this morning.  No recurrent chest pain with ambulation, no shortness of breath.  Patient is motivated to cut down on his smoking, eat healthier.    Physical Exam   Blood pressure 123/69, pulse 67, temperature 36.1 °C (97 °F), temperature source Temporal, resp. rate 16, height 1.905 m (6' 3\"), weight 115.1 kg (253 lb 12 oz), SpO2 97 %.    Constitutional:  Appears well-developed.   HENT: Normocephalic and atraumatic. No scleral icterus.   Neck: No JVD present.   Cardiovascular: Normal rate. Exam reveals no gallop and no friction rub. No murmur heard.   Pulmonary/Chest: CTAB e   Abdominal: S/NT/ND BS+   Musculoskeletal: Exhibits no edema. Pulses present.  Skin: Skin is warm and dry.     ROS: As HPI other reviewed and negative       Intake/Output Summary (Last 24 hours) at 2/16/2020 1156  Last data filed at 2/15/2020 1800  Gross per 24 hour   Intake 540 ml   Output --   Net 540 ml       Recent Labs     02/14/20  1015 02/15/20  0220   WBC 17.5* 16.8*   RBC 5.43 5.03   HEMOGLOBIN 17.2 16.1   HEMATOCRIT 51.0 47.3   MCV 93.9 94.0   MCH 31.7 32.0   MCHC 33.7 34.0   RDW 44.2 44.0   PLATELETCT 196 186   MPV 9.7 9.9     Recent Labs     02/14/20  1015 02/15/20  0220   SODIUM 134* 133*   POTASSIUM 4.6 4.4   CHLORIDE 104 107   CO2 21 18*   GLUCOSE 169* 166*   BUN 17 24*   CREATININE 1.12 1.14   CALCIUM 9.2 8.6     Recent Labs     02/14/20  1015   APTT 21.2*   INR 0.97             Recent Labs     02/15/20  0220   TRIGLYCERIDE 221*   HDL 31*   *       No current facility-administered medications on file prior to encounter.      Current Outpatient Medications on File Prior to Encounter   Medication Sig Dispense Refill   • DULoxetine (CYMBALTA) 30 MG Cap DR Particles Take 30 mg by mouth every day.     • gabapentin (NEURONTIN) 400 MG Cap Take 400 mg by mouth 3 times a day.         Current Facility-Administered Medications   Medication Dose Frequency Provider Last Rate Last Dose   • metFORMIN " (GLUCOPHAGE) tablet 500 mg  500 mg BID WITH MEALS Felton Tran M.D.   500 mg at 02/16/20 0758   • lisinopril (PRINIVIL) tablet 5 mg  5 mg Q DAY Felton Tran M.D.       • influenza vaccine quad injection 0.5 mL  0.5 mL Once Kendra Gore M.D.       • oxyCODONE immediate-release (ROXICODONE) tablet 5 mg  5 mg Q4HRS PRN Felton Tran M.D.   5 mg at 02/16/20 0759   • traZODone (DESYREL) tablet 50 mg  50 mg QHS Nadeen Palafox, D.O.   50 mg at 02/15/20 2204   • aspirin EC (ECOTRIN) tablet 81 mg  81 mg DAILY Baudilio Torres M.D.   81 mg at 02/16/20 0551   • atorvastatin (LIPITOR) tablet 80 mg  80 mg Q EVENING Baudilio Torres M.D.   80 mg at 02/15/20 1813   • DULoxetine (CYMBALTA) capsule 30 mg  30 mg DAILY Baudilio Torres M.D.   30 mg at 02/16/20 0552   • gabapentin (NEURONTIN) capsule 400 mg  400 mg TID Baudilio Torres M.D.   400 mg at 02/16/20 0551   • senna-docusate (PERICOLACE or SENOKOT S) 8.6-50 MG per tablet 2 Tab  2 Tab BID Baudilio Torres M.D.   2 Tab at 02/14/20 1413    And   • polyethylene glycol/lytes (MIRALAX) PACKET 1 Packet  1 Packet QDAY PRN Baudilio Torres M.D.        And   • magnesium hydroxide (MILK OF MAGNESIA) suspension 30 mL  30 mL QDAY PRN Baudilio Torres M.D.        And   • bisacodyl (DULCOLAX) suppository 10 mg  10 mg QDAY PRN Baudilio Torres M.D.       • Respiratory Therapy Consult   Continuous RT Baudilio Torres M.D.       • acetaminophen (TYLENOL) tablet 650 mg  650 mg Q6HRS PRN Baudilio Torres M.D.       • enalaprilat (VASOTEC) injection 1.25 mg  1.25 mg Q6HRS PRN Baudilio Torres M.D.       • nicotine (NICODERM) 21 MG/24HR 21 mg  21 mg Daily-0600 Baudilio Torres M.D.   21 mg at 02/16/20 0550    And   • nicotine polacrilex (NICORETTE) 2 MG piece 2 mg  2 mg Q HOUR PRN Baudilio Torres M.D.       • thiamine tablet 100 mg  100 mg DAILY Baudilio Torres M.D.   100 mg at 02/16/20 0552   • guaiFENesin ER (MUCINEX) tablet 1,200 mg  1,200 mg BID Baudilio Torres M.D.   1,200 mg at 02/16/20 0552   •  carvedilol (COREG) tablet 3.125 mg  3.125 mg BID WITH MEALS Baudilio Torres M.D.   3.125 mg at 02/16/20 0759   • clopidogrel (PLAVIX) tablet 75 mg  75 mg DAILY Merrill Edwards M.D.   75 mg at 02/16/20 0551   • albuterol (PROVENTIL) 2.5mg/0.5ml nebulizer solution 2.5 mg  2.5 mg Q4H PRN (RT) Baudilio Torres M.D.       • menthol-methyl salicylate (IcyHot\BENGAY) ointment   [facility] Leticia Whitney M.D.       Last reviewed on 2/14/2020 10:10 AM by Kasey Carpio    Medications reviewed    Imaging reviewed    ECHO(2/15/2020):  CONCLUSIONS  Contrast was used to enhance definition of the endocardial borders.   Left ventricular ejection fraction is visually estimated to be 60%.  Unable to estimate pulmonary artery pressure due to an inadequate   tricuspid regurgitant jet.    Impressions:  64-year-old male patient with  1.  Coronary artery disease, prior CABG  2.  Non-ST elevation MI status post PCI of vein graft to marginal  3.  Residual disease in vein graft to RCA, complex degenerative lesions  4.  Diabetes  5.  Hyperlipidemia    Recommendations:  He feels well.  No chest pain, shortness of breath.   We will treat his other lesion medically, he has normal LV function.    Continue aspirin, Lipitor, Coreg, Plavix, lisinopril.  Blood pressure normal.  [facility]    Discussed with primary physician and bedside nursing.    Do not hesitate to call me with questions regarding the patient care.    Michael Dior  Interventional cardiologist  Cell: [phone]

## 2020-02-16 NOTE — PROGRESS NOTES
Pt has discharge orders. Pt educated on discharge instructions and new prescriptions.  Pt verbalizes understanding.  Pt [facility]. [facility].  Plavix brought to bedside by pharmacist.  PIV removed, monitor checked in.

## 2020-02-16 NOTE — CARE PLAN
Problem: Communication  Goal: The ability to communicate needs accurately and effectively will improve  Outcome: PROGRESSING AS EXPECTED  Note: Discussed POC and medications with patient. Pt verbalized understanding.      Problem: Discharge Barriers/Planning  Goal: Patient's continuum of care needs will be met  Outcome: PROGRESSING AS EXPECTED  Note: Meds to beds called to bedside to deliver plavix to patient.